# Patient Record
Sex: FEMALE | Race: WHITE | Employment: UNEMPLOYED | URBAN - METROPOLITAN AREA
[De-identification: names, ages, dates, MRNs, and addresses within clinical notes are randomized per-mention and may not be internally consistent; named-entity substitution may affect disease eponyms.]

---

## 2018-01-18 ENCOUNTER — HOSPITAL ENCOUNTER (OUTPATIENT)
Dept: MAMMOGRAPHY | Age: 53
Discharge: HOME | End: 2018-01-18
Attending: OBSTETRICS & GYNECOLOGY | Admitting: OBSTETRICS & GYNECOLOGY

## 2018-01-18 DIAGNOSIS — Z12.31 VISIT FOR SCREENING MAMMOGRAM: ICD-10-CM

## 2018-01-20 ENCOUNTER — HOSPITAL ENCOUNTER (OUTPATIENT)
Dept: MAMMOGRAPHY | Age: 53
Discharge: HOME | End: 2018-01-20
Attending: OBSTETRICS & GYNECOLOGY | Admitting: OBSTETRICS & GYNECOLOGY

## 2018-01-20 DIAGNOSIS — Z12.31 VISIT FOR SCREENING MAMMOGRAM: ICD-10-CM

## 2018-01-23 ENCOUNTER — HOSPITAL ENCOUNTER (OUTPATIENT)
Dept: MAMMOGRAPHY | Age: 53
Discharge: HOME | End: 2018-01-23
Attending: OBSTETRICS & GYNECOLOGY | Admitting: OBSTETRICS & GYNECOLOGY

## 2018-01-23 DIAGNOSIS — R92.8 ABNORMAL FINDING ON BREAST IMAGING: ICD-10-CM

## 2019-01-28 ENCOUNTER — HOSPITAL ENCOUNTER (OUTPATIENT)
Dept: MAMMOGRAPHY | Age: 54
Discharge: HOME | End: 2019-01-28
Payer: COMMERCIAL

## 2019-01-28 DIAGNOSIS — Z12.31 VISIT FOR SCREENING MAMMOGRAM: ICD-10-CM

## 2019-01-28 PROCEDURE — 77063 BREAST TOMOSYNTHESIS BI: CPT

## 2019-01-30 ENCOUNTER — HOSPITAL ENCOUNTER (OUTPATIENT)
Dept: ULTRASOUND IMAGING | Age: 54
Discharge: HOME | End: 2019-01-30
Payer: COMMERCIAL

## 2019-01-30 ENCOUNTER — HOSPITAL ENCOUNTER (OUTPATIENT)
Dept: MAMMOGRAPHY | Age: 54
Discharge: HOME | End: 2019-01-30
Payer: COMMERCIAL

## 2019-01-30 DIAGNOSIS — R92.8 ABNORMAL MAMMOGRAM: ICD-10-CM

## 2019-01-30 PROCEDURE — 76642 ULTRASOUND BREAST LIMITED: CPT

## 2019-01-30 PROCEDURE — 77065 DX MAMMO INCL CAD UNI: CPT

## 2019-06-25 ENCOUNTER — HOSPITAL ENCOUNTER (OUTPATIENT)
Dept: MAMMOGRAPHY | Age: 54
Discharge: HOME | End: 2019-06-25
Payer: COMMERCIAL

## 2019-06-25 DIAGNOSIS — Z12.31 VISIT FOR SCREENING MAMMOGRAM: ICD-10-CM

## 2019-06-25 PROCEDURE — G0279 TOMOSYNTHESIS, MAMMO: HCPCS

## 2021-08-05 ENCOUNTER — HOSPITAL ENCOUNTER (OUTPATIENT)
Dept: ULTRASOUND IMAGING | Age: 56
Discharge: HOME | End: 2021-08-05
Payer: COMMERCIAL

## 2021-08-05 ENCOUNTER — HOSPITAL ENCOUNTER (OUTPATIENT)
Dept: MAMMOGRAPHY | Age: 56
Discharge: HOME | End: 2021-08-05
Payer: COMMERCIAL

## 2021-08-05 VITALS — HEIGHT: 63 IN | WEIGHT: 160 LBS | BODY MASS INDEX: 28.35 KG/M2

## 2021-08-05 DIAGNOSIS — Z12.31 VISIT FOR SCREENING MAMMOGRAM: ICD-10-CM

## 2021-08-05 DIAGNOSIS — R92.8 ABNORMAL MAMMOGRAM: ICD-10-CM

## 2021-08-05 PROCEDURE — 77063 BREAST TOMOSYNTHESIS BI: CPT

## 2021-08-05 PROCEDURE — 76642 ULTRASOUND BREAST LIMITED: CPT

## 2021-08-10 ENCOUNTER — HOSPITAL ENCOUNTER (OUTPATIENT)
Dept: ULTRASOUND IMAGING | Age: 56
Discharge: HOME | End: 2021-08-10
Payer: COMMERCIAL

## 2021-08-10 ENCOUNTER — HOSPITAL ENCOUNTER (OUTPATIENT)
Dept: MAMMOGRAPHY | Age: 56
Discharge: HOME | End: 2021-08-10
Payer: COMMERCIAL

## 2021-08-10 DIAGNOSIS — N63.0 BREAST MASS: ICD-10-CM

## 2021-08-10 DIAGNOSIS — R92.8 ABNORMAL MAMMOGRAM: ICD-10-CM

## 2021-08-10 PROCEDURE — 77065 DX MAMMO INCL CAD UNI: CPT

## 2021-08-10 PROCEDURE — 88342 IMHCHEM/IMCYTCHM 1ST ANTB: CPT

## 2021-08-10 PROCEDURE — 88305 TISSUE EXAM BY PATHOLOGIST: CPT

## 2021-08-10 PROCEDURE — 88360 TUMOR IMMUNOHISTOCHEM/MANUAL: CPT

## 2021-08-10 PROCEDURE — 2720000010 US BREAST BIOPSY W LOC DEVICE 1ST LESION RIGHT

## 2021-08-10 PROCEDURE — 88341 IMHCHEM/IMCYTCHM EA ADD ANTB: CPT

## 2021-08-12 ENCOUNTER — TELEPHONE (OUTPATIENT)
Dept: SURGERY | Age: 56
End: 2021-08-12

## 2021-08-12 NOTE — TELEPHONE ENCOUNTER
Breast History:  History of Previous Breast Biopsy:no  Self Breast Exams Completed:yes  Family History of Breast Cancer:no    Family History of Other Cancers:yes-Brother Lymphoma 55  55   Ashkenazi Religion Decent:no  Bra Size: 38EEE    Gyne History:  : 1,   Para: 1  Age of Menarche: 14 years  Age of Menopause: 47 years   Age of first live Birth: 31 years  History of Hysterectomy / SYD-BSO: No  History of OCP's/HRT:Estrodial   When and how lon years - Stopped 20  Family History or personal history of Ovarian Cancer: no       6.6% Lifetime Risk

## 2021-08-13 ENCOUNTER — OFFICE VISIT (OUTPATIENT)
Dept: SURGERY | Age: 56
End: 2021-08-13
Payer: COMMERCIAL

## 2021-08-13 ENCOUNTER — TELEPHONE (OUTPATIENT)
Dept: SURGERY | Age: 56
End: 2021-08-13

## 2021-08-13 VITALS — BODY MASS INDEX: 30.25 KG/M2 | WEIGHT: 164.4 LBS | HEIGHT: 62 IN

## 2021-08-13 DIAGNOSIS — Z17.0 MALIGNANT NEOPLASM OF UPPER-OUTER QUADRANT OF RIGHT BREAST IN FEMALE, ESTROGEN RECEPTOR POSITIVE (HCC): Primary | ICD-10-CM

## 2021-08-13 DIAGNOSIS — C50.411 MALIGNANT NEOPLASM OF UPPER-OUTER QUADRANT OF RIGHT BREAST IN FEMALE, ESTROGEN RECEPTOR POSITIVE (HCC): Primary | ICD-10-CM

## 2021-08-13 PROCEDURE — 99244 OFF/OP CNSLTJ NEW/EST MOD 40: CPT | Performed by: SURGERY

## 2021-08-13 RX ORDER — ESCITALOPRAM OXALATE 10 MG/1
TABLET ORAL
COMMUNITY
Start: 2021-06-26

## 2021-08-13 RX ORDER — BUPROPION HYDROCHLORIDE 150 MG/1
TABLET ORAL
COMMUNITY
Start: 2021-06-22

## 2021-08-13 RX ORDER — CALCIUM POLYCARBOPHIL 625 MG 625 MG/1
TABLET ORAL 2 TIMES DAILY
COMMUNITY

## 2021-08-13 NOTE — TELEPHONE ENCOUNTER
Patient is returning call from [place] about changing her surgery date. Please call the patient back at [phone].

## 2021-08-13 NOTE — LETTER
CONSENT TO OPERATION  AND/OR OTHER PROCEDURE(S)          PATIENT : Lizeth Maxwell   YOB: 1965      DATE : 8/13/21          1. I request and consent that Dr. Carl Woodruff,  and/or his associates or assistants perform an operation and/or procedures on the above patient at  [facility] to treat the condition(s) which appear indicated by the diagnostic studies already performed. I have been told that in general terms the nature, purpose and reasonable expectations of the operation and/or procedure(s) are:       Radiofrequency Identification Localized Right Breast Lumpectomy, Right Guild Node Biopsy with Radioisotope and Gamma Probe        2. It has been explained to me by the informing physician that during the course of the operation and/or procedure(s) unforeseen conditions may be revealed that necessitate an extension of the original operation and/or procedure(s) or different operation and/or procedures than those set forth in Paragraph 1. I therefore authorize and request that my physician and/or his associates or assistants perform such operations and/or procedures as are necessary and desirable in the exercise of professional judgment. The authority granted under this Paragraph 2 shall extend to all conditions that require treatment and are known to my physician at the time the operation is commenced. 3. I have been made aware by the informing physician of certain risks and consequences that are associated with the operation and/or procedure(s) described in Paragraph 1, the reasonable alternative methods or treatment, the possible consequences, the possibility that the operation and/or procedure(s) may be unsuccessful and the possibility of complications.   I understand the reasonably known risks to be:      - Bleeding  - Infection/COVID 19 / Needs a Pre Op Exam  - Poor Healing  - Possible Damage to Nerve, Vessel, Tendon/Muscle or Bone  - Possible need for additional Treatment/Surgery/Re Excision  - Stiffness  - Persistent Pain   - Residual or Recurrent Symptoms  - Anesthetic and/or Medical Risks  - Possible failed operation  - Lymphedema/swelling  - Nipple loss  - Numbness or nerve injury  - Unappealing cosmetics              4. I have also been informed by the informing physician that there are other risks from both known and unknown causes that are attendant to the performance of any surgical procedure. I am aware that the practice of medicine and surgery is not an exact science, and that no guarantees have been made to me concerning the results of the operation and/or procedure(s). 5. I   CONSENT / REFUSE CONSENT  (strike the phrase that does not apply) to the taking of photographs before, during and/or after the operation or procedure for scientific/educational purposes. 6. I consent to the administration of anesthesia and to the use of such anesthetics as may be deemed advisable by the anesthesiologist who has been engaged by me or my physician. 7. I certify that I have read and understand the above consent to operation and/or other procedure(s); that the explanations therein referred to were made to me by the informing physician in advance of my signing this consent; that all blanks or statements requiring insertion or completion were filled in and inapplicable paragraphs, if any, were stricken before I signed; and that all questions asked by me about the operation and/or procedure(s) which I have consented to have been fully answered in a satisfactory manner.               _______________________  Witness        Signature Of Patient          _______________________  Informing Physician         Signature of Informing Physician           If patient is unable to sign or is a minor, complete one of the following:    (A)  Patient is a minor   years of age.     (B)  Patient is unable to sign because:

## 2021-08-13 NOTE — PATIENT INSTRUCTIONS
Pathology results were discussed with patient today in office  Breast exam   Discussed surgery options / risks / recovery / aftercare  Discussed  radiation and chemotherapy treatments  Discussed medications       Surgery Consent signed  Needs a Pre Op Exam - Surgery Date 08.27.21 ( Pre Op Form Given )  Patient is vaccinated      Healthy Lifestyle Recommendations: healthy diet (decrease consumption of red meat, increase fresh fruits and vegetables), decreased alcohol consumption (less than 4 drinks/week), adequate sleep (goal 6-8 hours), routine exercise (goal 150 minutes/week or greater), weight control.

## 2021-08-13 NOTE — PROGRESS NOTES
Subjective:      Patient ID: Delvin Contreras is a 63 y.o. female. HPI   Chief Complaint   Patient presents with    Consultation     NP Consult - Referred By Dr. Robert Cohen [facility]     Patient had a screening mammogram earlier this month showing scattered fibroglandular tissue and a new small nodule right breast. Ultrasound showed a 7 mm mass 11:00, 2 cmfn. No axillary adenopathy. Core biopsy of the mass showed invasive ductal carcinoma, ER/MN positive, Her2 equivocal. She has not felt any breast masses, no breast pain or nipple discharge. Breast History:  History of Previous Breast Biopsy:no  Self Breast Exams Completed:yes  Family History of Breast Cancer:no    Family History of Other Cancers:yes-Brother Lymphoma 55  55   Ashkenazi Zoroastrian Decent:no  Bra Size: 43EEE     Gyne History:  : 1,   Para: 1  Age of Menarche: 14 years  Age of Menopause: 47 years   Age of first live Birth: 31 years  History of Hysterectomy / SYD-BSO: No  History of OCP's/HRT:Estrodial   When and how lon years - Stopped 20  Family History or personal history of Ovarian Cancer: no         6.6% Lifetime Risk                      No past medical history on file. Past Surgical History:   Procedure Laterality Date    US BREAST NEEDLE BIOPSY RIGHT Right 8/10/2021    US BREAST NEEDLE BIOPSY RIGHT 8/10/2021 [facility] ULTRASOUND       No current outpatient medications on file. No current facility-administered medications for this visit.        Social History     Socioeconomic History    Marital status:      Spouse name: Not on file    Number of children: Not on file    Years of education: Not on file    Highest education level: Not on file   Occupational History    Not on file   Tobacco Use    Smoking status: Not on file   Substance and Sexual Activity    Alcohol use: Not on file    Drug use: Not on file    Sexual activity: Not on file   Other Topics Concern    Not on file   Social History Narrative    Not on file     Social Determinants of Health     Financial Resource Strain:     Difficulty of Paying Living Expenses:    Food Insecurity:     Worried About Running Out of Food in the Last Year:     [address] in the Last Year:    Transportation Needs:     Lack of Transportation (Medical):  Lack of Transportation (Non-Medical):    Physical Activity:     Days of Exercise per Week:     Minutes of Exercise per Session:    Stress:     Feeling of Stress :    Social Connections:     Frequency of Communication with Friends and Family:     Frequency of Social Gatherings with Friends and Family:     Attends Evangelical Services:     Active Member of Clubs or Organizations:     Attends Club or Organization Meetings:     Marital Status:    Intimate Partner Violence:     Fear of Current or Ex-Partner:     Emotionally Abused:     Physically Abused:     Sexually Abused:        Objective:   Physical Exam    Bilateral breasts - Normal contour, no masses, no nipple changes. Small amount of ecchymosis right breast post biopsy. No cervical or axillary adenopathy. Assessment:      Diagnosis Orders   1. Malignant neoplasm of upper-outer quadrant of right breast in female, estrogen receptor positive ([facility] [address]            Plan:     Discussed breast biopsy results with patient. Reviewed surgical options, including lumpectomy, mastectomy, and mastectomy with reconstruction. Also reviewed the sentinal node procedure. Recommendations were made following standard NCCN guidelines. She wishes to proceed with RFID localized right breast lumpectomy and sentinel node biopsy for clinical T1N0 ductal carcinoma.  The indications for the planned procedure, along with the potential benefits and risks which include but are not limited to the risk of anesthesia, bleeding, infection, possible failed operation, possible need for additional surgery pending final pathologic assessment, nipple loss, lymphedema, sensation changes, nerve injury, persistent pain, and unappealing cosmetics were reviewed. The discussion I have done encompasses risks, benefits, and side effects related to the alternatives and the risks related to not receiving the proposed care, treatment, and services. All questions were answered and she agrees to proceed. The patient was counseled at length about the risks of faiza Covid-19 in the tao-operative and post-operative states including the recovery window of their procedure. The patient was made aware that faiza Covid-19 after a surgical procedure may worsen their prognosis for recovering from the virus and lend to a higher morbidity and or mortality risk. The patient was given the options of postponing their procedure. All of the risks, benefits, and alternatives were discussed. The patient does wish to proceed with the procedure. On this date 08/13/21 I have spent 60 minutes reviewing previous notes, test results, and face to face with the patient discussing the diagnosis and importance of compliance with the treatment plan as well as documenting on the day of the visit.      Electronically signed by Jamison Babin MD on 8/13/2021 at 11:29 AM

## 2021-08-17 ENCOUNTER — HOSPITAL ENCOUNTER (OUTPATIENT)
Dept: MAMMOGRAPHY | Age: 56
Discharge: HOME | End: 2021-08-17
Payer: COMMERCIAL

## 2021-08-17 ENCOUNTER — HOSPITAL ENCOUNTER (OUTPATIENT)
Dept: ULTRASOUND IMAGING | Age: 56
Discharge: HOME | End: 2021-08-17
Payer: COMMERCIAL

## 2021-08-17 DIAGNOSIS — Z12.31 VISIT FOR SCREENING MAMMOGRAM: ICD-10-CM

## 2021-08-17 DIAGNOSIS — Z17.0 MALIGNANT NEOPLASM OF UPPER-OUTER QUADRANT OF RIGHT BREAST IN FEMALE, ESTROGEN RECEPTOR POSITIVE (HCC): ICD-10-CM

## 2021-08-17 DIAGNOSIS — C50.411 MALIGNANT NEOPLASM OF UPPER-OUTER QUADRANT OF RIGHT BREAST IN FEMALE, ESTROGEN RECEPTOR POSITIVE (HCC): ICD-10-CM

## 2021-08-17 DIAGNOSIS — R92.8 ABNORMAL MAMMOGRAM: ICD-10-CM

## 2021-08-17 PROCEDURE — 2709999900 US PLACE BREAST LOC DEVICE 1ST LESION RIGHT

## 2021-08-17 PROCEDURE — 77065 DX MAMMO INCL CAD UNI: CPT

## 2021-08-20 ENCOUNTER — TELEPHONE (OUTPATIENT)
Dept: SURGERY | Age: 56
End: 2021-08-20

## 2021-08-20 NOTE — TELEPHONE ENCOUNTER
Spoke with patient regarding scheduled surgery on 08/25/21 with Dr. Lisa Francisco. Patient is asked to arrive by 7:15 am @ The [address] [place] [address] [address] after midnight. May take any Heart or Blood Pressure medication with a small sip of water to wash them down. You will need someone to drive you home following this procedure. Please bring a Photo ID & Insurance card with you, and check-in at the Surgery Desk Patient will see  PCP for Pre-op H & P on 8.21.21. Surgery is scheduled to start at approx. 9:15 am and should take approx. 90 minutes. If the hospital needs any further information, someone will give you a call. Patient expressed a verbal understanding of these instructions and had no further questions at this time. Mailed instruction letter. Call ended.

## 2021-08-23 NOTE — PROGRESS NOTES
[address] [facility] patients having surgery or anesthesia are required to be Covid tested OR to have been vaccinated at least 14 days prior to your procedure. It is very important to return our call to [phone] and notify the staff of your last vaccination date otherwise you will be required to complete Covid PCR test within the 5-6 days prior to surgery & quarantine. The results will need to be faxed to PreAdmission Testing at [phone]. PRIOR TO PROCEDURE DATE:        1. PLEASE FOLLOW ANY  GUIDELINES/ INSTRUCTIONS PRIOR TO YOUR PROCEDURE AS ADVISED BY YOUR SURGEON. 2. Arrange for someone to drive you home and be with you for the first 24 hours after discharge for your safety after your procedure for which you received sedation. Ensure it is someone we can share information with regarding your discharge. 3. You must contact your surgeon for instructions IF:   You are taking any blood thinners, aspirin, anti-inflammatory or vitamin E.   There is a change in your physical condition such as a cold, fever, rash, cuts, sores or any other infection, especially near your surgical site. 4. Do not drink alcohol the day before or day of your procedure. 5. A Pre-op History and Physical for surgery MUST be completed by your Physician or Urgent Care within 30 days of your procedure date. Please bring a copy with you on the day of your procedure and along with any other testing performed. THE DAY OF YOUR PROCEDURE:  1. Follow instructions for ARRIVAL TIME as DIRECTED BY YOUR SURGEON. 2. Enter the MAIN entrance from [address] [place] [address] and follow the signs to the free [employer] or [employer] parking (offered free of charge 6am-5pm). 3. Enter the Main Entrance of the hospital (do not enter from the lower level of the parking garage). Upon entrance, check in with the  at the main desk on your left. If no one is available at the desk, proceed into the [facility] Waiting Room and go through the door directly into the [facility]. There is a Check-in desk ACROSS from Room 5 (marked with a sign hanging from the ceiling). The phone number for the surgery center is [phone]. 4. Please call [phone] option #2 option #2 if you have not been preregistered yet. On the day of your procedure bring your insurance card and photo ID. You will be registered at your bedside once brought back to your room. 5. DO NOT EAT ANYTHING eight hours prior to your arrival for surgery. May have 8 ounces of water 4 hours prior to your arrival for surgery. NOTE: ALL Gastric, Bariatric and Bowel surgery patients MUST follow their surgeon's instructions. 6. MEDICATIONS    Take the following medications with a SMALL sip of water:    Bariatric patient's call surgeon if on diabetic medications as some need to be stopped 1 week preop   Use your usual dose of inhalers the morning of surgery. BRING your rescue inhaler with you to hospital.    Anesthesia does NOT want you to take insulin the morning of surgery. They will control your blood sugar while you are at the hospital. Please contact your ordering physician for instructions regarding your insulin the night before your procedure. If you have an insulin pump, please keep it set on basal rate. 7. Do not swallow water when brushing teeth. No gum, candy, mints or ice chips. Refrain from smoking or at least decrease the amount. 8. Dress in loose, comfortable clothing appropriate for redressing after your procedure. Do not wear jewelry (including body piercings), make-up (especially NO eye make-up), fingernail polish (NO toenail polish if foot/leg surgery), lotion, powders or metal hairclips. 9. Dentures, glasses, or contacts will need to be removed before your procedure.  Bring cases for your glasses, contacts, dentures, or hearing aids to protect them while you are in surgery. 10. If you use a CPAP, please bring it with you on the day of your procedure. 11. We recommend that valuable personal  belongings such as cash, cell phones, e-tablets or jewelry, be left at home during your stay. The hospital will not be responsible for valuables that are not secured in the hospital safe. However, if your insurance requires a co-pay, you may want to bring a method of payment, i.e. Check or credit card, if you wish to pay your co-pay the day of surgery. 12. If you are to stay overnight, you may bring a bag with personal items. Please have any large items you may need brought in by your family after your arrival to your hospital room. 15. If you have a Living Will or Durable Power of , please bring a copy on the day of your procedure. 15. With your permission, one family member may accompany you while you are being prepared for surgery. Once you are ready, additional family members may join you. HOW WE KEEP YOU SAFE and WORK TO PREVENT SURGICAL SITE INFECTIONS:  1. Health care workers should always check your ID bracelet to verify your name and birth date. You will be asked many times to state your name, date of birth, and allergies. 2. Health care workers should always clean their hands with soap or alcohol gel before providing care to you. It is okay to ask anyone if they cleaned their hands before they touch you. 3. You will be actively involved in verifying the type of procedure you are having and ensuring the correct surgical site. This will be confirmed multiple times prior to your procedure. Do NOT saad your surgery site UNLESS instructed to by your surgeon. 4. Do not shave or wax for 72 hours prior to procedure near your operative site. Shaving with a razor can irritate your skin and make it easier to develop an infection.  On the day of your procedure, any hair that needs to be removed near the surgical site will be clipped by a healthcare worker using a special clippers designed to avoid skin irritation. 5. When you are in the operating room, your surgical site will be cleansed with a special soap, and in most cases, you will be given an antibiotic before the surgery begins. What to expect AFTER YOUR PROCEDURE:  1. Immediately following your procedure, your will be taken to the PACU for the first phase of your recovery. Your nurse will help you recover from any potential side effects of anesthesia, such as extreme drowsiness, changes in your vital signs or breathing patterns. Nausea, headache, muscle aches, or sore throat may also occur after anesthesia. Your nurse will help you manage these potential side effects. 2. For comfort and safety, arrange to have someone at home with you for the first 24 hours after discharge. 3. You and your family will be given written instructions about your diet, activity, dressing care, medications, and return visits. 4. Once at home, should issues with nausea, pain, or bleeding occur, or should you notice any signs of infection, you should call your surgeon. 5. Always clean your hands before and after caring for your wound. Do not let your family touch your surgery site without cleaning their hands. 6. Narcotic pain medications can cause significant constipation. You may want to add a stool softener to your postoperative medication schedule or speak to your surgeon on how best to manage this SIDE EFFECT. SPECIAL INSTRUCTIONS     Thank you for allowing us to care for you. We strive to exceed your expectations in the delivery of care and service provided to you and your family. If you need to contact the [place] [address] staff for any reason, please call us at [phone]    Instructions reviewed with patient during preadmission testing phone interview.   Julia Nava RN.8/23/2021 .10:47 AM      ADDITIONAL EDUCATIONAL INFORMATION REVIEWED PER PHONE WITH YOU AND/OR YOUR FAMILY:  No Hibiclens® Bathing Instructions   Yes Antibacterial Soap

## 2021-08-24 ENCOUNTER — ANESTHESIA EVENT (OUTPATIENT)
Dept: OPERATING ROOM | Age: 56
End: 2021-08-24
Payer: COMMERCIAL

## 2021-08-25 ENCOUNTER — HOSPITAL ENCOUNTER (OUTPATIENT)
Dept: NUCLEAR MEDICINE | Age: 56
Discharge: HOME | End: 2021-08-25
Payer: COMMERCIAL

## 2021-08-25 ENCOUNTER — HOSPITAL ENCOUNTER (OUTPATIENT)
Dept: MAMMOGRAPHY | Age: 56
Discharge: HOME | End: 2021-08-25
Payer: COMMERCIAL

## 2021-08-25 ENCOUNTER — ANESTHESIA (OUTPATIENT)
Dept: OPERATING ROOM | Age: 56
End: 2021-08-25
Payer: COMMERCIAL

## 2021-08-25 ENCOUNTER — HOSPITAL ENCOUNTER (OUTPATIENT)
Age: 56
Setting detail: OUTPATIENT SURGERY
Discharge: HOME | End: 2021-08-25
Attending: SURGERY | Admitting: SURGERY
Payer: COMMERCIAL

## 2021-08-25 VITALS
DIASTOLIC BLOOD PRESSURE: 75 MMHG | TEMPERATURE: 98.5 F | SYSTOLIC BLOOD PRESSURE: 126 MMHG | OXYGEN SATURATION: 100 % | WEIGHT: 163 LBS | HEART RATE: 70 BPM | HEIGHT: 62 IN | BODY MASS INDEX: 30 KG/M2 | RESPIRATION RATE: 16 BRPM

## 2021-08-25 VITALS — DIASTOLIC BLOOD PRESSURE: 91 MMHG | TEMPERATURE: 97.2 F | OXYGEN SATURATION: 100 % | SYSTOLIC BLOOD PRESSURE: 159 MMHG

## 2021-08-25 DIAGNOSIS — R93.89 ABNORMAL FINDING ON IMAGING: ICD-10-CM

## 2021-08-25 DIAGNOSIS — Z17.0 MALIGNANT NEOPLASM OF UPPER-OUTER QUADRANT OF RIGHT BREAST IN FEMALE, ESTROGEN RECEPTOR POSITIVE (HCC): Primary | ICD-10-CM

## 2021-08-25 DIAGNOSIS — Z17.0 MALIGNANT NEOPLASM OF UPPER-OUTER QUADRANT OF RIGHT BREAST IN FEMALE, ESTROGEN RECEPTOR POSITIVE (HCC): ICD-10-CM

## 2021-08-25 DIAGNOSIS — C50.411 MALIGNANT NEOPLASM OF UPPER-OUTER QUADRANT OF RIGHT FEMALE BREAST, UNSPECIFIED ESTROGEN RECEPTOR STATUS (HCC): ICD-10-CM

## 2021-08-25 DIAGNOSIS — C50.411 MALIGNANT NEOPLASM OF UPPER-OUTER QUADRANT OF RIGHT BREAST IN FEMALE, ESTROGEN RECEPTOR POSITIVE (HCC): ICD-10-CM

## 2021-08-25 DIAGNOSIS — C50.411 MALIGNANT NEOPLASM OF UPPER-OUTER QUADRANT OF RIGHT BREAST IN FEMALE, ESTROGEN RECEPTOR POSITIVE (HCC): Primary | ICD-10-CM

## 2021-08-25 LAB — PREGNANCY, URINE: NEGATIVE

## 2021-08-25 PROCEDURE — 38525 BIOPSY/REMOVAL LYMPH NODES: CPT | Performed by: SURGERY

## 2021-08-25 PROCEDURE — 7100000011 HC PHASE II RECOVERY - ADDTL 15 MIN: Performed by: SURGERY

## 2021-08-25 PROCEDURE — 2720000010 HC SURG SUPPLY STERILE: Performed by: SURGERY

## 2021-08-25 PROCEDURE — 3430000000 HC RX DIAGNOSTIC RADIOPHARMACEUTICAL: Performed by: SURGERY

## 2021-08-25 PROCEDURE — 2709999900 HC NON-CHARGEABLE SUPPLY: Performed by: SURGERY

## 2021-08-25 PROCEDURE — 2580000003 HC RX 258: Performed by: ANESTHESIOLOGY

## 2021-08-25 PROCEDURE — 19301 PARTIAL MASTECTOMY: CPT | Performed by: SURGERY

## 2021-08-25 PROCEDURE — 88307 TISSUE EXAM BY PATHOLOGIST: CPT

## 2021-08-25 PROCEDURE — 14001 TIS TRNFR TRUNK 10.1-30SQCM: CPT | Performed by: SURGERY

## 2021-08-25 PROCEDURE — 6360000002 HC RX W HCPCS: Performed by: NURSE ANESTHETIST, CERTIFIED REGISTERED

## 2021-08-25 PROCEDURE — 3600000014 HC SURGERY LEVEL 4 ADDTL 15MIN: Performed by: SURGERY

## 2021-08-25 PROCEDURE — 2500000003 HC RX 250 WO HCPCS: Performed by: SURGERY

## 2021-08-25 PROCEDURE — 88305 TISSUE EXAM BY PATHOLOGIST: CPT

## 2021-08-25 PROCEDURE — 3600000004 HC SURGERY LEVEL 4 BASE: Performed by: SURGERY

## 2021-08-25 PROCEDURE — 19294 PREPJ TUM CAV IORT PRTL MAST: CPT | Performed by: SURGERY

## 2021-08-25 PROCEDURE — 84703 CHORIONIC GONADOTROPIN ASSAY: CPT

## 2021-08-25 PROCEDURE — 7100000010 HC PHASE II RECOVERY - FIRST 15 MIN: Performed by: SURGERY

## 2021-08-25 PROCEDURE — 3700000000 HC ANESTHESIA ATTENDED CARE: Performed by: SURGERY

## 2021-08-25 PROCEDURE — 76098 X-RAY EXAM SURGICAL SPECIMEN: CPT

## 2021-08-25 PROCEDURE — 2500000003 HC RX 250 WO HCPCS: Performed by: NURSE ANESTHETIST, CERTIFIED REGISTERED

## 2021-08-25 PROCEDURE — A4648 IMPLANTABLE TISSUE MARKER: HCPCS | Performed by: SURGERY

## 2021-08-25 PROCEDURE — 78195 LYMPH SYSTEM IMAGING: CPT

## 2021-08-25 PROCEDURE — 2580000003 HC RX 258: Performed by: SURGERY

## 2021-08-25 PROCEDURE — 7100000000 HC PACU RECOVERY - FIRST 15 MIN: Performed by: SURGERY

## 2021-08-25 PROCEDURE — 88342 IMHCHEM/IMCYTCHM 1ST ANTB: CPT

## 2021-08-25 PROCEDURE — 7100000001 HC PACU RECOVERY - ADDTL 15 MIN: Performed by: SURGERY

## 2021-08-25 PROCEDURE — A9541 TC99M SULFUR COLLOID: HCPCS | Performed by: SURGERY

## 2021-08-25 PROCEDURE — 3700000001 HC ADD 15 MINUTES (ANESTHESIA): Performed by: SURGERY

## 2021-08-25 DEVICE — MARKER SURG TISS W2XH1XL2CM BIOZORB LO PROF: Type: IMPLANTABLE DEVICE | Site: BREAST | Status: FUNCTIONAL

## 2021-08-25 RX ORDER — BUPIVACAINE HYDROCHLORIDE 5 MG/ML
INJECTION, SOLUTION EPIDURAL; INTRACAUDAL PRN
Status: DISCONTINUED | OUTPATIENT
Start: 2021-08-25 | End: 2021-08-25 | Stop reason: ALTCHOICE

## 2021-08-25 RX ORDER — OXYCODONE HYDROCHLORIDE AND ACETAMINOPHEN 5; 325 MG/1; MG/1
1 TABLET ORAL PRN
Status: DISCONTINUED | OUTPATIENT
Start: 2021-08-25 | End: 2021-08-25 | Stop reason: HOSPADM

## 2021-08-25 RX ORDER — DIPHENHYDRAMINE HYDROCHLORIDE 50 MG/ML
12.5 INJECTION INTRAMUSCULAR; INTRAVENOUS
Status: DISCONTINUED | OUTPATIENT
Start: 2021-08-25 | End: 2021-08-25 | Stop reason: HOSPADM

## 2021-08-25 RX ORDER — HYDROCODONE BITARTRATE AND ACETAMINOPHEN 5; 325 MG/1; MG/1
1 TABLET ORAL EVERY 6 HOURS PRN
Qty: 5 TABLET | Refills: 0 | Status: SHIPPED | OUTPATIENT
Start: 2021-08-25 | End: 2021-08-28

## 2021-08-25 RX ORDER — DEXAMETHASONE SODIUM PHOSPHATE 4 MG/ML
INJECTION, SOLUTION INTRA-ARTICULAR; INTRALESIONAL; INTRAMUSCULAR; INTRAVENOUS; SOFT TISSUE PRN
Status: DISCONTINUED | OUTPATIENT
Start: 2021-08-25 | End: 2021-08-25 | Stop reason: SDUPTHER

## 2021-08-25 RX ORDER — OXYCODONE HYDROCHLORIDE AND ACETAMINOPHEN 5; 325 MG/1; MG/1
2 TABLET ORAL PRN
Status: DISCONTINUED | OUTPATIENT
Start: 2021-08-25 | End: 2021-08-25 | Stop reason: HOSPADM

## 2021-08-25 RX ORDER — FENTANYL CITRATE 50 UG/ML
25 INJECTION, SOLUTION INTRAMUSCULAR; INTRAVENOUS EVERY 5 MIN PRN
Status: DISCONTINUED | OUTPATIENT
Start: 2021-08-25 | End: 2021-08-25 | Stop reason: HOSPADM

## 2021-08-25 RX ORDER — MAGNESIUM HYDROXIDE 1200 MG/15ML
LIQUID ORAL CONTINUOUS PRN
Status: COMPLETED | OUTPATIENT
Start: 2021-08-25 | End: 2021-08-25

## 2021-08-25 RX ORDER — ACETAMINOPHEN 325 MG/1
650 TABLET ORAL
Status: DISCONTINUED | OUTPATIENT
Start: 2021-08-25 | End: 2021-08-25 | Stop reason: HOSPADM

## 2021-08-25 RX ORDER — EPHEDRINE SULFATE 50 MG/ML
INJECTION INTRAVENOUS PRN
Status: DISCONTINUED | OUTPATIENT
Start: 2021-08-25 | End: 2021-08-25 | Stop reason: SDUPTHER

## 2021-08-25 RX ORDER — PROPOFOL 10 MG/ML
INJECTION, EMULSION INTRAVENOUS PRN
Status: DISCONTINUED | OUTPATIENT
Start: 2021-08-25 | End: 2021-08-25 | Stop reason: SDUPTHER

## 2021-08-25 RX ORDER — FENTANYL CITRATE 50 UG/ML
INJECTION, SOLUTION INTRAMUSCULAR; INTRAVENOUS PRN
Status: DISCONTINUED | OUTPATIENT
Start: 2021-08-25 | End: 2021-08-25 | Stop reason: SDUPTHER

## 2021-08-25 RX ORDER — SODIUM CHLORIDE, SODIUM LACTATE, POTASSIUM CHLORIDE, CALCIUM CHLORIDE 600; 310; 30; 20 MG/100ML; MG/100ML; MG/100ML; MG/100ML
INJECTION, SOLUTION INTRAVENOUS CONTINUOUS
Status: DISCONTINUED | OUTPATIENT
Start: 2021-08-25 | End: 2021-08-25 | Stop reason: HOSPADM

## 2021-08-25 RX ORDER — NEOSTIGMINE METHYLSULFATE 5 MG/5 ML
SYRINGE (ML) INTRAVENOUS PRN
Status: DISCONTINUED | OUTPATIENT
Start: 2021-08-25 | End: 2021-08-25 | Stop reason: SDUPTHER

## 2021-08-25 RX ORDER — LABETALOL HYDROCHLORIDE 5 MG/ML
5 INJECTION, SOLUTION INTRAVENOUS EVERY 10 MIN PRN
Status: DISCONTINUED | OUTPATIENT
Start: 2021-08-25 | End: 2021-08-25 | Stop reason: HOSPADM

## 2021-08-25 RX ORDER — FENTANYL CITRATE 50 UG/ML
50 INJECTION, SOLUTION INTRAMUSCULAR; INTRAVENOUS EVERY 5 MIN PRN
Status: DISCONTINUED | OUTPATIENT
Start: 2021-08-25 | End: 2021-08-25 | Stop reason: HOSPADM

## 2021-08-25 RX ORDER — GLYCOPYRROLATE 1 MG/5 ML
SYRINGE (ML) INTRAVENOUS PRN
Status: DISCONTINUED | OUTPATIENT
Start: 2021-08-25 | End: 2021-08-25 | Stop reason: SDUPTHER

## 2021-08-25 RX ORDER — ONDANSETRON 2 MG/ML
INJECTION INTRAMUSCULAR; INTRAVENOUS PRN
Status: DISCONTINUED | OUTPATIENT
Start: 2021-08-25 | End: 2021-08-25 | Stop reason: SDUPTHER

## 2021-08-25 RX ORDER — ROCURONIUM BROMIDE 10 MG/ML
INJECTION, SOLUTION INTRAVENOUS PRN
Status: DISCONTINUED | OUTPATIENT
Start: 2021-08-25 | End: 2021-08-25 | Stop reason: SDUPTHER

## 2021-08-25 RX ORDER — MIDAZOLAM HYDROCHLORIDE 1 MG/ML
INJECTION INTRAMUSCULAR; INTRAVENOUS PRN
Status: DISCONTINUED | OUTPATIENT
Start: 2021-08-25 | End: 2021-08-25 | Stop reason: SDUPTHER

## 2021-08-25 RX ORDER — ONDANSETRON 2 MG/ML
4 INJECTION INTRAMUSCULAR; INTRAVENOUS
Status: DISCONTINUED | OUTPATIENT
Start: 2021-08-25 | End: 2021-08-25 | Stop reason: HOSPADM

## 2021-08-25 RX ORDER — HYDRALAZINE HYDROCHLORIDE 20 MG/ML
5 INJECTION INTRAMUSCULAR; INTRAVENOUS EVERY 10 MIN PRN
Status: DISCONTINUED | OUTPATIENT
Start: 2021-08-25 | End: 2021-08-25 | Stop reason: HOSPADM

## 2021-08-25 RX ORDER — PROCHLORPERAZINE EDISYLATE 5 MG/ML
5 INJECTION INTRAMUSCULAR; INTRAVENOUS
Status: DISCONTINUED | OUTPATIENT
Start: 2021-08-25 | End: 2021-08-25 | Stop reason: HOSPADM

## 2021-08-25 RX ADMIN — EPHEDRINE SULFATE 5 MG: 50 INJECTION INTRAVENOUS at 09:50

## 2021-08-25 RX ADMIN — EPHEDRINE SULFATE 5 MG: 50 INJECTION INTRAVENOUS at 09:56

## 2021-08-25 RX ADMIN — MIDAZOLAM HYDROCHLORIDE 2 MG: 2 INJECTION, SOLUTION INTRAMUSCULAR; INTRAVENOUS at 09:11

## 2021-08-25 RX ADMIN — SODIUM CHLORIDE, SODIUM LACTATE, POTASSIUM CHLORIDE, AND CALCIUM CHLORIDE: .6; .31; .03; .02 INJECTION, SOLUTION INTRAVENOUS at 08:15

## 2021-08-25 RX ADMIN — FENTANYL CITRATE 50 MCG: 50 INJECTION, SOLUTION INTRAMUSCULAR; INTRAVENOUS at 09:21

## 2021-08-25 RX ADMIN — Medication 4 MG: at 10:25

## 2021-08-25 RX ADMIN — Medication 0.8 MILLICURIE: at 09:34

## 2021-08-25 RX ADMIN — PROPOFOL 50 MG: 10 INJECTION, EMULSION INTRAVENOUS at 09:21

## 2021-08-25 RX ADMIN — SODIUM CHLORIDE, SODIUM LACTATE, POTASSIUM CHLORIDE, AND CALCIUM CHLORIDE: .6; .31; .03; .02 INJECTION, SOLUTION INTRAVENOUS at 09:14

## 2021-08-25 RX ADMIN — ONDANSETRON 4 MG: 2 INJECTION INTRAMUSCULAR; INTRAVENOUS at 10:31

## 2021-08-25 RX ADMIN — ROCURONIUM BROMIDE 50 MG: 10 INJECTION INTRAVENOUS at 09:20

## 2021-08-25 RX ADMIN — FENTANYL CITRATE 50 MCG: 50 INJECTION, SOLUTION INTRAMUSCULAR; INTRAVENOUS at 09:19

## 2021-08-25 RX ADMIN — PROPOFOL 150 MG: 10 INJECTION, EMULSION INTRAVENOUS at 09:19

## 2021-08-25 RX ADMIN — Medication 0.2 MG: at 09:45

## 2021-08-25 RX ADMIN — Medication 0.8 MG: at 10:25

## 2021-08-25 RX ADMIN — DEXAMETHASONE SODIUM PHOSPHATE 8 MG: 4 INJECTION, SOLUTION INTRAMUSCULAR; INTRAVENOUS at 09:43

## 2021-08-25 RX ADMIN — EPHEDRINE SULFATE 5 MG: 50 INJECTION INTRAVENOUS at 09:46

## 2021-08-25 ASSESSMENT — PULMONARY FUNCTION TESTS
PIF_VALUE: 18
PIF_VALUE: 18
PIF_VALUE: 17
PIF_VALUE: 18
PIF_VALUE: 17
PIF_VALUE: 17
PIF_VALUE: 18
PIF_VALUE: 18
PIF_VALUE: 17
PIF_VALUE: 18
PIF_VALUE: 7
PIF_VALUE: 17
PIF_VALUE: 18
PIF_VALUE: 18
PIF_VALUE: 17
PIF_VALUE: 18
PIF_VALUE: 18
PIF_VALUE: 19
PIF_VALUE: 21
PIF_VALUE: 17
PIF_VALUE: 17
PIF_VALUE: 19
PIF_VALUE: 18
PIF_VALUE: 17
PIF_VALUE: 1
PIF_VALUE: 20
PIF_VALUE: 18
PIF_VALUE: 17
PIF_VALUE: 16
PIF_VALUE: 0
PIF_VALUE: 17
PIF_VALUE: 2
PIF_VALUE: 3
PIF_VALUE: 18
PIF_VALUE: 1
PIF_VALUE: 18
PIF_VALUE: 18
PIF_VALUE: 17
PIF_VALUE: 18
PIF_VALUE: 18
PIF_VALUE: 1
PIF_VALUE: 17
PIF_VALUE: 18
PIF_VALUE: 18
PIF_VALUE: 16
PIF_VALUE: 17
PIF_VALUE: 18
PIF_VALUE: 17
PIF_VALUE: 18
PIF_VALUE: 17
PIF_VALUE: 8
PIF_VALUE: 1
PIF_VALUE: 1
PIF_VALUE: 19
PIF_VALUE: 18
PIF_VALUE: 0
PIF_VALUE: 18
PIF_VALUE: 19
PIF_VALUE: 18
PIF_VALUE: 18
PIF_VALUE: 17
PIF_VALUE: 18
PIF_VALUE: 2
PIF_VALUE: 18
PIF_VALUE: 17
PIF_VALUE: 18
PIF_VALUE: 18
PIF_VALUE: 0
PIF_VALUE: 18
PIF_VALUE: 17
PIF_VALUE: 17

## 2021-08-25 ASSESSMENT — PAIN - FUNCTIONAL ASSESSMENT: PAIN_FUNCTIONAL_ASSESSMENT: 0-10

## 2021-08-25 NOTE — H&P
[address]    [id number]    [facility] Same Day Surgery Update H & P  Department of General Surgery   Surgical Service   Pre-operative History and Physical  Last H & P within the last 30 days. DIAGNOSIS:   Malignant neoplasm of upper-outer quadrant of right female breast, unspecified estrogen receptor status ([facility] [address] [C50.411]    Procedure(s):  RADIOFREQUENCY INDENTIFICATION LOCALIZED RIGHT LUMPECTOMY, RIGHT AXILLARY SENTINEL NODE BIOPSY WITH RADIOSOTOPE WITH GAMMA PROBE  . History obtained from: Patient interview and EHR      HISTORY OF PRESENT ILLNESS:   Patient with biopsy demonstrated right breast invasive ductal carcinoma and the patient presents today for the above procedure. Covid 19:  Patient denies fever, chills, worsening cough, or known exposure to Covid-19.        Past Medical History:        Diagnosis Date    Diverticulitis      Past Surgical History:        Procedure Laterality Date    APPENDECTOMY      CHOLECYSTECTOMY      COLECTOMY  1988    COLONOSCOPY      LAPAROSCOPY      OVARY SURGERY Left     US BREAST NEEDLE BIOPSY RIGHT Right 8/10/2021    US BREAST NEEDLE BIOPSY RIGHT 8/10/2021 [facility]    US GUIDED NEEDLE LOC OF RIGHT BREAST Right 8/17/2021    US GUIDED NEEDLE LOC OF RIGHT BREAST 8/17/2021 Richard Spangler MD [facility]     Past Social History:  Social History     Socioeconomic History    Marital status:      Spouse name: None    Number of children: None    Years of education: None    Highest education level: None   Occupational History    None   Tobacco Use    Smoking status: Never Smoker    Smokeless tobacco: Never Used   Substance and Sexual Activity    Alcohol use: Never    Drug use: Never    Sexual activity: None   Other Topics Concern    None   Social History Narrative    None     Social Determinants of Health     Financial Resource Strain:     Difficulty of Paying Living Expenses:    Food Insecurity:     Worried About Running Out of Food in the Last Year:    [address] in the Last Year:    Transportation Needs:     Lack of Transportation (Medical):  Lack of Transportation (Non-Medical):    Physical Activity:     Days of Exercise per Week:     Minutes of Exercise per Session:    Stress:     Feeling of Stress :    Social Connections:     Frequency of Communication with Friends and Family:     Frequency of Social Gatherings with Friends and Family:     Attends Restoration Services:     Active Member of Clubs or Organizations:     Attends Club or Organization Meetings:     Marital Status:    Intimate Partner Violence:     Fear of Current or Ex-Partner:     Emotionally Abused:     Physically Abused:     Sexually Abused:          Medications Prior to Admission:      Prior to Admission medications    Medication Sig Start Date End Date Taking? Authorizing Provider   polycarbophil (FIBERCON) 625 MG tablet Take by mouth 2 times daily   Yes Historical Provider, MD   MULTIPLE VITAMIN PO Take by mouth daily   Yes Historical Provider, MD   buPROPion (WELLBUTRIN XL) 150 MG extended release tablet  6/22/21  Yes Historical Provider, MD   escitalopram (LEXAPRO) 10 MG tablet  6/26/21  Yes Historical Provider, MD         Allergies:  Patient has no known allergies. PHYSICAL EXAM:      BP (!) 147/90   Pulse 67   Temp 98.3 °F (36.8 °C) (Oral)   Resp 14   Ht 5' 2\" (1.575 m)   Wt 163 lb (73.9 kg)   SpO2 97%   BMI 29.81 kg/m²      Airway:  Airway patent with no audible stridor    Heart:  Regular rate and rhythm, No murmur noted    Lungs:  No increased work of breathing, good air exchange, clear to auscultation bilaterally, no crackles or wheezing    Abdomen:  Soft, non-distended, non-tender, no rebound tenderness or guarding, and no masses palpated    ASSESSMENT AND PLAN     Patient is a 63 y.o. female with above specified procedure planned.     1.  The patients history and physical was obtained and signed off by the pre-admission testing department. Patient seen and focused exam done today- no new changes since last physical exam on 8/21/21    2. Access to ancillary services are available per request of the provider.     NEHEMIAS Stewart - ANDI     8/25/2021

## 2021-08-25 NOTE — OP NOTE
Operative Note      Patient: Liliam Bowers  YOB: 1965  MRN: [id number]    Date of Procedure: 8/25/2021    Pre-Op Diagnosis: Malignant neoplasm of upper-outer quadrant of right female breast, unspecified estrogen receptor status ([facility] [address] [C50.411]    Post-Op Diagnosis: Same       Procedure(s):  RADIOFREQUENCY INDENTIFICATION LOCALIZED RIGHT LUMPECTOMY, RIGHT AXILLARY SENTINEL NODE BIOPSY WITH RADIOSOTOPE WITH GAMMA PROBE  . Surgeon(s): Dillan Poon MD    Assistant:   Surgical Assistant: Lord Mills    Anesthesia: General    Estimated Blood Loss (mL): Minimal    Complications: None    Specimens:   ID Type Source Tests Collected by Time Destination   A : RIGHT BREAST CANCER Tissue Tissue SURGICAL PATHOLOGY Dillan Poon MD 8/25/2021 5472    B : BREAST MARGIN-ANTERIOR/SUPERIOR Tissue Tissue SURGICAL PATHOLOGY Dillan Poon MD 8/25/2021 0957    C : RIGHT BREAST MARGIN-MEDIAL Tissue Tissue SURGICAL PATHOLOGY Dillan Poon MD 8/25/2021 5277    D : RIGHT BREAST MARGIN-INFERIOR/POSTERIOR Tissue Tissue SURGICAL PATHOLOGY Dillan Poon MD 8/25/2021 1000    E : RIGHT BREAST MARGIN-LATERAL Tissue Tissue SURGICAL PATHOLOGY Dillan Poon MD 8/25/2021 1001    F : RIGHT AXILLARY SENTINEL NODE Tissue Tissue SURGICAL PATHOLOGY Dillan Poon MD 8/25/2021 1006        Implants:  * No implants in log *      Drains: * No LDAs found *    Findings: see op note    Detailed Description of Procedure:              Operative Report      Name:  Liliam Bowers   MRN:  [id number]  Date:  8/25/2021        PREOPERATIVE DIAGNOSIS: right breast cancer. POSTOPERATIVE DIAGNOSIS: same    PROCEDURE:RFID localized right partial mastectomy, right axillary sentinel lymph node biopsy.     SURGEON: Lola    ESTIMATED BLOOD LOSS:  Less than 25 mL    SPECIMENS: right breast tissue, sentinel nodes    ASSISTANT: SURJIT العراقي    ANESTHESIA: General.    INDICATIONS FOR PROCEDURE: The patient is a 63 y.o. female who had  presented with breast cancer. She is here now for RFID localized partial mastectomy and axillary sentinel lymph node biopsy for clinical stage T1N0 ductal carcinoma. The risks, benefits and alternatives were discussed with the  patient. Questions were answered and she is agreeable to proceed. DESCRIPTION OF OPERATION: The patient was brought to the operating room and  placed on the OR table in the supine position. General anesthesia was  obtained and the right breast and axillary region were prepped and draped in  the usual sterile fashion. The patient had radioisotope injection after induction per nuclear medicine for identification of sentinel node. Patient had also gone to radiology prior to surgery for RFID localization. An incision was made in the upper outer periareolar border on the right breast and carried down through the skin and subcutaneous tissues. Flaps were created in the subcutanous tissue around the area of the tumor. The tumor was localized using the RFID localizer. The tissue around the tumor was dissected free from the surrounding breast tissue. The specimen was marked with a short suture superiorly, and a long suture laterally, and this was sent to radiology, which confirmed removal of the area in question, and then sent to pathology for permanent section. I took additional margins from all 6 sides, and these were marked with ink on the new true margins and also sent to pathology. There was a fairly large surgical defect caused by  the removal of the breast tissue which was measured to be 4 cm x 3 cm in size. This made a defect measuring 12 cm2. It was elected to repair the surgical  defect using an oncoplastic tissue advancement procedure to achieve a breast  reconstruction with local tissue flaps.  Tissue mobilization was done by  creating a superior and inferior tissue flap using cautery, and the flaps were  then mobilized and transferred to the central aspect of the surgical cavity. I placed a 2 cm low profile Biozorb device into the cavity. The deep tissue was then reapproximated to the Biozorb using interrupted 3-0 PDS suture. The subcutaneous tissue was closed with 3-0 vicryl. Once this was completed, the deformity in the breast was no longer present. A transverse incision was then made in the right axilla, and carried down thru the subcutaneous tissues, and the fascia was divided. The gamma probe was used to identify a sentinel lymph node within the axillary region. This node was dissected free from the surrounding tissue, and vessels and lymphatics were divided using the Harmonic scalpel. This node had a 10 second count of [id number] and was sent to pathology as axillary sentinel lymph node. The residual gamma probe activity within the axillary region was minimal. There were no other enlarged lymph nodes palpated within the axillary region. Hemostasis was assured. The wounds were irrigated and injected with local anesthetic, and both wounds were closed with layered vicryl, and the skin was re-approximated with 4-0 Monocryl and covered with surgical glue. Patient was placed in a surgical bra. Sponge, needle and instrument counts were correct per nursing. The patient tolerated the procedure well. She was extubated and taken to the  recovery room in stable condition.     Electronically signed by Gunjan Chau MD on 8/25/2021 at 10:22 AM

## 2021-08-25 NOTE — ANESTHESIA PRE PROCEDURE
Department of Anesthesiology  Preprocedure Note       Name:  Dl Parker   Age:  63 y.o.  :  1965                                          MRN:  [id number]         Date:  2021      Surgeon: Brianna Naranjo): Dang Pelletier MD    Procedure: Procedure(s):  RADIOFREQUENCY INDENTIFICATION LOCALIZED RIGHT LUMPECTOMY, RIGHT AXILLARY SENTINEL NODE BIOPSY WITH RADIOSOTOPE WITH GAMMA PROBE  . Medications prior to admission:   Prior to Admission medications    Medication Sig Start Date End Date Taking?  Authorizing Provider   polycarbophil (FIBERCON) 625 MG tablet Take by mouth 2 times daily   Yes Historical Provider, MD   MULTIPLE VITAMIN PO Take by mouth daily   Yes Historical Provider, MD   buPROPion (WELLBUTRIN XL) 150 MG extended release tablet  21  Yes Historical Provider, MD   escitalopram (LEXAPRO) 10 MG tablet  21  Yes Historical Provider, MD       Current medications:    Current Facility-Administered Medications   Medication Dose Route Frequency Provider Last Rate Last Admin    lactated ringers infusion   IntraVENous Continuous Neil Zuniga DO        ceFAZolin (ANCEF) 2000 mg in dextrose 5 % 50 mL IVPB  2,000 mg IntraVENous Once Dang Pelletier MD        acetaminophen (TYLENOL) tablet 650 mg  650 mg Oral 60 Min Pre-Op Dang Pelletier MD           Allergies:  No Known Allergies    Problem List:    Patient Active Problem List   Diagnosis Code    Malignant neoplasm of upper-outer quadrant of right breast in female, estrogen receptor positive [facility] C50.411, Z17.0       Past Medical History:        Diagnosis Date    Diverticulitis        Past Surgical History:        Procedure Laterality Date    APPENDECTOMY      CHOLECYSTECTOMY      COLECTOMY      COLONOSCOPY      LAPAROSCOPY      OVARY SURGERY Left     US BREAST NEEDLE BIOPSY RIGHT Right 8/10/2021    US BREAST NEEDLE BIOPSY RIGHT 8/10/2021 [address] [facility]    US GUIDED NEEDLE LOC OF RIGHT BREAST Right 2021 US GUIDED NEEDLE LOC OF RIGHT BREAST 8/17/2021 Jeanell Frankel,  [address] [facility]       Social History:    Social History     Tobacco Use    Smoking status: Never Smoker    Smokeless tobacco: Never Used   Substance Use Topics    Alcohol use: Never                                Counseling given: Not Answered      Vital Signs (Current):   Vitals:    08/23/21 1041 08/25/21 0732   BP:  (!) 147/90   Pulse:  67   Resp:  14   Temp:  98.3 °F (36.8 °C)   TempSrc:  Oral   SpO2:  97%   Weight: 163 lb (73.9 kg) 163 lb (73.9 kg)   Height: 5' 2.5\" (1.588 m) 5' 2\" (1.575 m)                                              BP Readings from Last 3 Encounters:   08/25/21 (!) 147/90       NPO Status: Time of last liquid consumption: 2100                        Time of last solid consumption: 2100                        Date of last liquid consumption: 08/24/21                        Date of last solid food consumption: 08/24/21    BMI:   Wt Readings from Last 3 Encounters:   08/25/21 163 lb (73.9 kg)   08/13/21 164 lb 6.4 oz (74.6 kg)   08/05/21 160 lb (72.6 kg)     Body mass index is 29.81 kg/m². CBC: No results found for: WBC, RBC, HGB, HCT, MCV, RDW, PLT    CMP: No results found for: NA, K, CL, CO2, BUN, CREATININE, GFRAA, AGRATIO, LABGLOM, GLUCOSE, PROT, CALCIUM, BILITOT, ALKPHOS, AST, ALT    POC Tests: No results for input(s): POCGLU, POCNA, POCK, POCCL, POCBUN, POCHEMO, POCHCT in the last 72 hours.     Coags: No results found for: PROTIME, INR, APTT    HCG (If Applicable):   Lab Results   Component Value Date    PREGTESTUR Negative 08/25/2021        ABGs: No results found for: PHART, PO2ART, XZD7ZDG, WXE9UCG, BEART, S0PRMNOU     Type & Screen (If Applicable):  No results found for: LABABO, LABRH    Drug/Infectious Status (If Applicable):  No results found for: HIV, HEPCAB    COVID-19 Screening (If Applicable): No results found for: COVID19        Anesthesia Evaluation    Airway: Mallampati: I  TM distance: >3 FB   Neck ROM: full  Mouth opening: > = 3 FB Dental: normal exam         Pulmonary:Negative Pulmonary ROS and normal exam                               Cardiovascular:Negative CV ROS  Exercise tolerance: good (>4 METS),                     Neuro/Psych:   Negative Neuro/Psych ROS              GI/Hepatic/Renal: Neg GI/Hepatic/Renal ROS            Endo/Other: Negative Endo/Other ROS                    Abdominal:             Vascular: negative vascular ROS. Other Findings:             Anesthesia Plan      general     ASA 2       Induction: intravenous. MIPS: Postoperative opioids intended. Anesthetic plan and risks discussed with patient. Use of blood products discussed with patient whom. Plan discussed with CRNA.     Attending anesthesiologist reviewed and agrees with Alcon Perkins MD   8/25/2021

## 2021-08-25 NOTE — ANESTHESIA POSTPROCEDURE EVALUATION
Department of Anesthesiology  Postprocedure Note    Patient: Ifeoma Rosenberg  MRN: [id number]  YOB: 1965  Date of evaluation: 8/25/2021  Time:  11:08 AM     Procedure Summary     Date: 08/25/21 Room / Location: [facility] [address]    Anesthesia Start: 6233 Anesthesia Stop: 8602    Procedures:       RADIOFREQUENCY INDENTIFICATION LOCALIZED RIGHT LUMPECTOMY, RIGHT AXILLARY SENTINEL NODE BIOPSY, BIOZORB INSERTION (Right )      . (Right ) Diagnosis:       Malignant neoplasm of upper-outer quadrant of right female breast, unspecified estrogen receptor status ([address]      (Malignant neoplasm of upper-outer quadrant of right female breast, unspecified estrogen receptor status ([address] [place])    Surgeons: Job White MD Responsible Provider: Sharon Gaytan MD    Anesthesia Type: general ASA Status: 2          Anesthesia Type: general    Gumaro Phase I: Gumaro Score: 9    Gumaro Phase II:      Last vitals: Reviewed and per EMR flowsheets.        Anesthesia Post Evaluation    Patient location during evaluation: PACU  Patient participation: complete - patient participated  Level of consciousness: awake and lethargic  Pain score: 2  Airway patency: patent  Nausea & Vomiting: no nausea and no vomiting  Complications: no  Cardiovascular status: hemodynamically stable and blood pressure returned to baseline  Respiratory status: spontaneous ventilation and nasal cannula  Hydration status: stable

## 2021-08-25 NOTE — PROGRESS NOTES
Patient admitted to pacu post [address] LUMPECTOMY, RIGHT AXILLARY SENTINEL NODE BIOPSY, BIOZORB INSERTION - Right with Dr. Brock Kellogg. Patient connected to bedside monitors; VSS. Per CRNA patient was stable intra-op.

## 2021-09-07 ENCOUNTER — OFFICE VISIT (OUTPATIENT)
Dept: SURGERY | Age: 56
End: 2021-09-07

## 2021-09-07 VITALS
WEIGHT: 166 LBS | BODY MASS INDEX: 29.41 KG/M2 | HEIGHT: 63 IN | TEMPERATURE: 97.8 F | SYSTOLIC BLOOD PRESSURE: 142 MMHG | DIASTOLIC BLOOD PRESSURE: 90 MMHG

## 2021-09-07 DIAGNOSIS — C50.411 MALIGNANT NEOPLASM OF UPPER-OUTER QUADRANT OF RIGHT BREAST IN FEMALE, ESTROGEN RECEPTOR POSITIVE (HCC): Primary | ICD-10-CM

## 2021-09-07 DIAGNOSIS — Z17.0 MALIGNANT NEOPLASM OF UPPER-OUTER QUADRANT OF RIGHT BREAST IN FEMALE, ESTROGEN RECEPTOR POSITIVE (HCC): Primary | ICD-10-CM

## 2021-09-07 DIAGNOSIS — Z90.11 S/P PARTIAL MASTECTOMY, RIGHT: ICD-10-CM

## 2021-09-07 PROCEDURE — 99024 POSTOP FOLLOW-UP VISIT: CPT | Performed by: SURGERY

## 2021-09-07 NOTE — PROGRESS NOTES
Subjective:      Patient ID: Kizzy Foreman is a 63 y.o. female. HPI   Chief Complaint   Patient presents with   [place] [address] Op     Patient is s/p right lumpectomy/snbx/Biozorb 8/25/2021 for T1cN0 ductal carcinoma, ER/SC positive, Her2 negative. Wounds healing well, minimal discomfort, instructed on wound care. To see oncology this week. Follow up in 5 months with right diagnostic mammogram.     Past Medical History:   Diagnosis Date    Diverticulitis        Past Surgical History:   Procedure Laterality Date    APPENDECTOMY      BREAST BIOPSY Right 8/25/2021    . performed by Ashwini Hein MD at [address] Right 8/25/2021    RADIOFREQUENCY INDENTIFICATION LOCALIZED RIGHT LUMPECTOMY, RIGHT AXILLARY SENTINEL NODE BIOPSY, BIOZORB INSERTION performed by Ashwini Hein MD at [address] Left     US BREAST NEEDLE BIOPSY RIGHT Right 8/10/2021    US BREAST NEEDLE BIOPSY RIGHT 8/10/2021 [facility]    US GUIDED NEEDLE LOC OF RIGHT BREAST Right 8/17/2021    US GUIDED NEEDLE LOC OF RIGHT BREAST 8/17/2021 Josette Colin MD [facility]       Current Outpatient Medications   Medication Sig Dispense Refill    polycarbophil (FIBERCON) 625 MG tablet Take by mouth 2 times daily      MULTIPLE VITAMIN PO Take by mouth daily      buPROPion (WELLBUTRIN XL) 150 MG extended release tablet       escitalopram (LEXAPRO) 10 MG tablet        No current facility-administered medications for this visit.        Social History     Socioeconomic History    Marital status:      Spouse name: Not on file    Number of children: Not on file    Years of education: Not on file    Highest education level: Not on file   Occupational History    Not on file   Tobacco Use    Smoking status: Never Smoker    Smokeless tobacco: Never Used   Substance and Sexual Activity    Alcohol use: Never    Drug use: Never    Sexual activity: Not on file   Other Topics Concern    Not on file   Social History Narrative    Not on file     Social Determinants of Health     Financial Resource Strain:     Difficulty of Paying Living Expenses:    Food Insecurity:     Worried About Running Out of Food in the Last Year:     [address] in the Last Year:    Transportation Needs:     Lack of Transportation (Medical):  Lack of Transportation (Non-Medical):    Physical Activity:     Days of Exercise per Week:     Minutes of Exercise per Session:    Stress:     Feeling of Stress :    Social Connections:     Frequency of Communication with Friends and Family:     Frequency of Social Gatherings with Friends and Family:     Attends [employer] Services:     Active Member of Clubs or Organizations:     Attends Club or Organization Meetings:     Marital Status:    Intimate Partner Violence:     Fear of Current or Ex-Partner:     Emotionally Abused:     Physically Abused:     Sexually Abused:        Objective:   Physical Exam        Assessment:      Diagnosis Orders   1. Malignant neoplasm of upper-outer quadrant of right breast in female, estrogen receptor positive ([facility] [address]     2.  S/P partial mastectomy, right            Plan:     See above       Electronically signed by Bailey Avalos MD on 9/7/2021 at 10:17 AM

## 2022-02-11 ENCOUNTER — OFFICE VISIT (OUTPATIENT)
Dept: SURGERY | Age: 57
End: 2022-02-11
Payer: COMMERCIAL

## 2022-02-11 ENCOUNTER — HOSPITAL ENCOUNTER (OUTPATIENT)
Dept: MAMMOGRAPHY | Age: 57
Discharge: HOME | End: 2022-02-11
Payer: COMMERCIAL

## 2022-02-11 VITALS
DIASTOLIC BLOOD PRESSURE: 72 MMHG | TEMPERATURE: 97.2 F | BODY MASS INDEX: 29.88 KG/M2 | OXYGEN SATURATION: 100 % | SYSTOLIC BLOOD PRESSURE: 116 MMHG | HEART RATE: 71 BPM | RESPIRATION RATE: 18 BRPM | HEIGHT: 63 IN

## 2022-02-11 DIAGNOSIS — Z85.3 PERSONAL HISTORY OF BREAST CANCER: ICD-10-CM

## 2022-02-11 DIAGNOSIS — Z90.11 HISTORY OF PARTIAL MASTECTOMY OF RIGHT BREAST: ICD-10-CM

## 2022-02-11 DIAGNOSIS — Z08 ENCOUNTER FOR FOLLOW-UP SURVEILLANCE OF BREAST CANCER: ICD-10-CM

## 2022-02-11 DIAGNOSIS — Z85.3 HISTORY OF BREAST CANCER: Primary | ICD-10-CM

## 2022-02-11 DIAGNOSIS — Z85.3 ENCOUNTER FOR FOLLOW-UP SURVEILLANCE OF BREAST CANCER: ICD-10-CM

## 2022-02-11 PROCEDURE — 99214 OFFICE O/P EST MOD 30 MIN: CPT | Performed by: NURSE PRACTITIONER

## 2022-02-11 PROCEDURE — G0279 TOMOSYNTHESIS, MAMMO: HCPCS

## 2022-02-11 ASSESSMENT — ENCOUNTER SYMPTOMS
COUGH: 0
ABDOMINAL PAIN: 0
SHORTNESS OF BREATH: 0

## 2022-02-11 NOTE — PROGRESS NOTES
[employer]   Surgical Breast Oncology     CC: 5month [id number] for breast check and mammogram     HPI: Rony Templeton is a 61 y.o. woman here for annual follow up for breast check secondary to personal history of right breast cancer. She is s/p right lumpectomy/snbx/Biozorb 8/25/2021 for T1cN0 invasive ductal carcinoma, ER/TX positive, Her2 negative. S/p adjuvant radiation therapy, completed 10/2021. Letrozole (started 11/2021) stopped beginning of January 1/2022 due to hot flashes, worsening depression, weight gain, and not feeling like herself; symptoms resolved after stopping medication. She understands the benefits of the medication and what the risks are of not taking the medication, does not think she wants to try an alternative but states she will think about it. She has no breast related concerns today. She states that she does perform routine self breast evaluations and has not noticed any new abnormalities such as masses, skin changes, color changes,nipple discharge, or changes to the nipple-areolar complex. Right unilateral diagnostic mammogram today 2/11/2022:  BioZorb device at 12:00 o'clock, 3 cm from the nipple in the anterior right breast tissue. The localizing clip and radiofrequency device has been removed. Probably benign postsurgical changes right breast.  No new concerning findings suggestive malignancy. BI-RADS 3. Past Medical History:   Diagnosis Date    Breast cancer ([facility] [address] 08/2021    Diverticulitis     History of therapeutic radiation        Past Surgical History:   Procedure Laterality Date    APPENDECTOMY      BREAST BIOPSY Right 8/25/2021    .  performed by Heather Camargo MD at [address] [place] [address] LUMPECTOMY Right 08/2021    BREAST SURGERY Right 8/25/2021    RADIOFREQUENCY INDENTIFICATION LOCALIZED RIGHT LUMPECTOMY, RIGHT AXILLARY SENTINEL NODE BIOPSY, BIOZORB INSERTION performed by Heather Camargo MD at [address] 1989    COLONOSCOPY      LAPAROSCOPY      OVARY SURGERY Left     US BREAST NEEDLE BIOPSY RIGHT Right 8/10/2021    US BREAST NEEDLE BIOPSY RIGHT 8/10/2021 [facility]TR[facility]    US GUIDED NEEDLE LOC OF RIGHT BREAST Right 8/17/2021    US GUIDED NEEDLE LOC OF RIGHT BREAST 8/17/2021 Kingsley Weaver MD [facility]TR[facility]       Family History   Problem Relation Age of Onset    Diabetes Mother     Diabetes Father        Allergies as of 02/11/2022    (No Known Allergies)       Social History     Tobacco Use    Smoking status: Never Smoker    Smokeless tobacco: Never Used   Vaping Use    Vaping Use: Never used   Substance Use Topics    Alcohol use: Never    Drug use: Never       Current Outpatient Medications on File Prior to Visit   Medication Sig Dispense Refill    polycarbophil (FIBERCON) 625 MG tablet Take by mouth 2 times daily      MULTIPLE VITAMIN PO Take by mouth daily      buPROPion (WELLBUTRIN XL) 150 MG extended release tablet       escitalopram (LEXAPRO) 10 MG tablet        No current facility-administered medications on file prior to visit. Medications: documentation has been reviewed in the electronic medical record and patient office intake form. REVIEW OF SYSTEMS:  Constitutional: Negative for fever  HENT: Negative for sore throat  Eyes: Negative for redness   Respiratory: Negative for dyspnea, cough  Cardiovascular: Negative for chest pain  Gastrointestinal: Negative for vomiting, diarrhea   Genitourinary: Negative for hematuria   Musculoskeletal: Negative for arthralgias   Skin: Negative for rash  Neurological: Negative for syncope  Hematological: Negative for adenopathy  Psychiatric/Behavorial: Negative for anxiety         PHYSICAL EXAM:  /72   Pulse 71   Temp 97.2 °F (36.2 °C)   Resp 18   Ht 5' 2.5\" (1.588 m)   SpO2 100%   BMI 29.88 kg/m²   Constitutional: She appearswell-nourished. No apparent distress.   Breast: The patient was examined in the upright and supine position. Breasts are ptotic L>R. Right: well healed scar. Volume loss present. Post surgical and radiation related changes as expected. No new masses or changes in breast contour. No skin changes of the breast or nipple areolar complex. No nipple inversion or discharge. No erythema, thickening (peau d'orange), or dimpling. Left: No new masses or changes in breast contour. No skin changes of the breast or nipple areolar complex. No nipple inversion or discharge. No erythema, thickening (peau d'orange), or dimpling. There is no axillary lymphadenopathy palpated bilaterally. Head: Normocephalic and atraumatic:   Eyes: EOM are normal. Pupils are equal, round, and reactive to light. Neck: Neck supple. No tracheal deviation present. No obvious mass. Lymphatics: No palpable supraclavicular, cervical, or axillary lymphadenopathy  Skin: No rash noted. No erythema. Neurologic: alert and oriented. Extremities: appear well perfused. ASSESSMENT:  - Screening Breast Examination - stable breast examination and stable right mammogram.   No concerning findings suggestive of malignancy or recurrence at this time. - Personal History of Breast Cancer - s/p right lumpectomy/snbx/Biozorb 8/25/2021 for T1cN0 invasive ductal carcinoma, ER/CO positive, Her2 negative. S/p adjuvant radiation therapy completed 10/2021.  - Aromatase inhibitor use - Letrozole per medical oncology. Patient self stopped 1/2022 due to s/e.   - Encounter for follow-up surveillance of breast cancer    PLAN:    Bilateral diagnostic mammogram 6-month follow-up, due 8/2022   Reviewed benefits of AIs. Recommend follow up with medical oncology to discuss alterative AI   Signs/symptoms of recurrence were reviewed. She verbalizes understanding that she should notify the office if she identifies any abnormalities on self evaluation.    Follow up cancer surveillance discussed    Discussed the importance of breast awareness including the importance and technique of self breast exams   Today's imaging was reviewed, documented above, the results were discussed with the patient, all questions answered   Healthy Lifestyle Recommendations: healthy diet (decrease consumption of red meat, increase fresh fruits and vegetables), decreased alcohol consumption (less than 4 drinks/week), adequate sleep (goal 6-8 hours), routine exercise (goal 150 minutes/week or greater), weight control.  Follow up after mammogram          TREMAINE Baker  [facility])   Surgical Breast Oncology   [phone]    All of the patient's questions were answered at this time however, she was encouraged to call the office with any further inquiries. Approximately 30 minutes of time were spent in preparation, direct patient contact, counseling, care coordination, documentation and activities otherwise related to this encounter.

## 2022-02-11 NOTE — PATIENT INSTRUCTIONS
Healthy Lifestyle Recommendations: healthy diet (decrease consumption of red meat, increase fresh fruits and vegetables), decreased alcohol consumption (less than 4 drinks/week), adequate sleep (goal 6-8 hours), routine exercise (goal 150 minutes/week or greater), weight control. Patient Education        Breast Self-Exam: Care Instructions  Your Care Instructions     A breast self-exam is when you check your breasts for lumps or changes. This regular exam helps you learn how your breasts normally look and feel. Most breast problems or changes are not because of cancer. Breast self-exam is not a substitute for a mammogram. Having regular breast exams by your doctor and regular mammograms improve your chances of finding any problems with your breasts. Some women set a time each month to do a step-by-step breast self-exam. Other women like a less formal system. They might look at their breasts as they brush their teeth, or feel their breasts once in a while in the shower. If you notice a change in your breast, tell your doctor. Follow-up care is a key part of your treatment and safety. Be sure to make and go to all appointments, and call your doctor if you are having problems. It's also a good idea to know your test results and keep a list of the medicines you take. How do you do a breast self-exam?  · The best time to examine your breasts is usually one week after your menstrual period begins. Your breasts should not be tender then. If you do not have periods, you might do your exam on a day of the month that is easy to remember. · To examine your breasts:  ? Remove all your clothes above the waist and lie down. When you are lying down, your breast tissue spreads evenly over your chest wall, which makes it easier to feel all your breast tissue. ?  Use the pads--not the fingertips--of the 3 middle fingers of your left hand to check your right breast. Move your fingers slowly in small coin-sized circles that overlap. ? Use three levels of pressure to feel of all your breast tissue. Use light pressure to feel the tissue close to the skin surface. Use medium pressure to feel a little deeper. Use firm pressure to feel your tissue close to your breastbone and ribs. Use each pressure level to feel your breast tissue before moving on to the next spot. ? Check your entire breast, moving up and down as if following a strip from the collarbone to the bra line, and from the armpit to the ribs. Repeat until you have covered the entire breast.  ? Repeat this procedure for your left breast, using the pads of the 3 middle fingers of your right hand. · To examine your breasts while in the shower:  ? Place one arm over your head and lightly soap your breast on that side. ? Using the pads of your fingers, gently move your hand over your breast (in the strip pattern described above), feeling carefully for any lumps or changes. ? Repeat for the other breast.  · Have your doctor inspect anything you notice to see if you need further testing. Where can you learn more? Go to https://[employer]. org and sign in to your [employer] account. Enter P148 in the [employer] box to learn more about \"Breast Self-Exam: Care Instructions. \"     If you do not have an account, please click on the \"Sign Up Now\" link. Current as of: September 8, 2021               Content Version: 13.1  © 2006-2021 Healthwise, Incorporated. Care instructions adapted under license by [facility] ([facility]). If you have questions about a medical condition or this instruction, always ask your healthcare professional. [place] [address] any warranty or liability for your use of this information. Constipation, unspecified constipation type

## 2022-08-11 ENCOUNTER — OFFICE VISIT (OUTPATIENT)
Dept: SURGERY | Age: 57
End: 2022-08-11
Payer: COMMERCIAL

## 2022-08-11 ENCOUNTER — HOSPITAL ENCOUNTER (OUTPATIENT)
Dept: MAMMOGRAPHY | Age: 57
Discharge: HOME | End: 2022-08-11
Payer: COMMERCIAL

## 2022-08-11 VITALS
OXYGEN SATURATION: 99 % | TEMPERATURE: 97.3 F | RESPIRATION RATE: 18 BRPM | SYSTOLIC BLOOD PRESSURE: 118 MMHG | WEIGHT: 179 LBS | HEIGHT: 63 IN | BODY MASS INDEX: 31.71 KG/M2 | DIASTOLIC BLOOD PRESSURE: 72 MMHG | HEART RATE: 79 BPM

## 2022-08-11 VITALS — BODY MASS INDEX: 29.41 KG/M2 | HEIGHT: 63 IN | WEIGHT: 166 LBS

## 2022-08-11 DIAGNOSIS — Z17.0 MALIGNANT NEOPLASM OF UPPER-OUTER QUADRANT OF RIGHT BREAST IN FEMALE, ESTROGEN RECEPTOR POSITIVE (HCC): ICD-10-CM

## 2022-08-11 DIAGNOSIS — Z90.11 HISTORY OF PARTIAL MASTECTOMY OF RIGHT BREAST: ICD-10-CM

## 2022-08-11 DIAGNOSIS — Z85.3 HX OF BREAST CANCER: ICD-10-CM

## 2022-08-11 DIAGNOSIS — Z08 ENCOUNTER FOR FOLLOW-UP SURVEILLANCE OF BREAST CANCER: ICD-10-CM

## 2022-08-11 DIAGNOSIS — C50.411 MALIGNANT NEOPLASM OF UPPER-OUTER QUADRANT OF RIGHT BREAST IN FEMALE, ESTROGEN RECEPTOR POSITIVE (HCC): ICD-10-CM

## 2022-08-11 DIAGNOSIS — Z85.3 ENCOUNTER FOR FOLLOW-UP SURVEILLANCE OF BREAST CANCER: ICD-10-CM

## 2022-08-11 DIAGNOSIS — Z85.3 HISTORY OF BREAST CANCER: Primary | ICD-10-CM

## 2022-08-11 DIAGNOSIS — Z12.31 ENCOUNTER FOR SCREENING MAMMOGRAM FOR BREAST CANCER: ICD-10-CM

## 2022-08-11 PROCEDURE — G0279 TOMOSYNTHESIS, MAMMO: HCPCS

## 2022-08-11 PROCEDURE — 77066 DX MAMMO INCL CAD BI: CPT

## 2022-08-11 PROCEDURE — 99213 OFFICE O/P EST LOW 20 MIN: CPT | Performed by: NURSE PRACTITIONER

## 2022-08-11 NOTE — PROGRESS NOTES
[facility])   Surgical Breast Oncology     CC: 5month [id number] for breast check and mammogram     HPI: Ashkan Zhou is a 61 y.o. woman here for annual follow up for breast check secondary to personal history of right breast cancer. She is s/p right lumpectomy/snbx/Biozorb 8/25/2021 for T1cN0 invasive ductal carcinoma, ER/NH positive, Her2 negative. S/p adjuvant radiation therapy, completed 10/2021. Letrozole (started 11/2021, stopped beginning of January 1/2022 due to hot flashes, worsening depression, weight gain, and not feeling like herself; symptoms resolved after stopping medication). She understands the benefits of the medication and what the risks are of not taking the medication, declines to resume. She has no breast related concerns today. She states that she does perform routine self breast evaluations and has not noticed any new abnormalities such as masses, skin changes, color changes,nipple discharge, or changes to the nipple-areolar complex. INTERVAL HISTORY:  Right unilateral diagnostic mammogram 2/11/2022:  BioZorb device at 12:00 o'clock, 3 cm from the nipple in the anterior right breast tissue. The localizing clip and radiofrequency device has been removed. Probably benign postsurgical changes right breast.  No new concerning findings suggestive malignancy. BI-RADS 3.    Bilateral diagnostic mammogram 8/11/2022:  BioZorb device at 12:00, 3 cm FN right breast, mild volume loss; consistent with patient's history and unchanged from most recent study. No new concerning findings suggestive of malignancy in either the right or left breast.  BI-RADS 2. Past Medical History:   Diagnosis Date    Breast cancer ([address] 08/2021    Diverticulitis     History of therapeutic radiation        Past Surgical History:   Procedure Laterality Date    APPENDECTOMY      BREAST BIOPSY Right 8/25/2021    .  performed by Rafaela Morgan MD at [facility]. LUMPECTOMY Right 08/2021    BREAST SURGERY Right 8/25/2021    RADIOFREQUENCY INDENTIFICATION LOCALIZED RIGHT LUMPECTOMY, RIGHT AXILLARY SENTINEL NODE BIOPSY, BIOZORB INSERTION performed by Rosie March MD at [address] Left     US BREAST NEEDLE BIOPSY RIGHT Right 8/10/2021    US BREAST NEEDLE BIOPSY RIGHT 8/10/2021 [facility] MOB ULTRASOUND    US GUIDED NEEDLE LOC OF RIGHT BREAST Right 8/17/2021    US GUIDED NEEDLE LOC OF RIGHT BREAST 8/17/2021 Nikki Weiner MD [facility] MOB ULTRASOUND       Family History   Problem Relation Age of Onset    Diabetes Mother     Diabetes Father        Allergies as of 08/11/2022    (No Known Allergies)       Social History     Tobacco Use    Smoking status: Never    Smokeless tobacco: Never   Vaping Use    Vaping Use: Never used   Substance Use Topics    Alcohol use: Never    Drug use: Never       Current Outpatient Medications on File Prior to Visit   Medication Sig Dispense Refill    polycarbophil (FIBERCON) 625 MG tablet Take by mouth 2 times daily      MULTIPLE VITAMIN PO Take by mouth daily      buPROPion (WELLBUTRIN XL) 150 MG extended release tablet       escitalopram (LEXAPRO) 10 MG tablet        No current facility-administered medications on file prior to visit. Medications: documentation has been reviewed in the electronic medical record and patient office intake form.        REVIEW OF SYSTEMS:  Constitutional: Negative for fever  HENT: Negative for sore throat  Eyes: Negative for redness   Respiratory: Negative for dyspnea, cough  Cardiovascular: Negative for chest pain  Gastrointestinal: Negative for vomiting, diarrhea   Genitourinary: Negative for hematuria   Musculoskeletal: Negative for arthralgias   Skin: Negative for rash  Neurological: Negative for syncope  Hematological: Negative for adenopathy  Psychiatric/Behavorial: Negative for anxiety         PHYSICAL EXAM:  /72   Pulse 79   Temp 97.3 °F (36.3 °C) Resp 18   Ht 5' 2.5\" (1.588 m)   Wt 179 lb (81.2 kg)   SpO2 99%   BMI 32.22 kg/m²   Constitutional: She appearswell-nourished. No apparent distress. Breast: The patient was examined in the upright and supine position. Breasts are ptotic L>R. Right: well healed scar. Volume loss present. Post surgical and radiation related changes as expected. No new masses or changes in breast contour. No skin changes of the breast or nipple areolar complex. No nipple inversion or discharge. No erythema, thickening (peau d'orange), or dimpling. Left: No new masses or changes in breast contour. No skin changes of the breast or nipple areolar complex. No nipple inversion or discharge. No erythema, thickening (peau d'orange), or dimpling. There is no axillary lymphadenopathy palpated bilaterally. Head: Normocephalic and atraumatic:   Eyes: EOM are normal. Pupils are equal, round, and reactive to light. Neck: Neck supple. No tracheal deviation present. No obvious mass. Lymphatics: No palpable supraclavicular, cervical, or axillary lymphadenopathy  Skin: No rash noted. No erythema. Neurologic: alert and oriented. Extremities: appear well perfused. ASSESSMENT:  - Screening Breast Examination - stable breast examination and stable right mammogram.   No concerning findings suggestive of malignancy or recurrence at this time. - Personal History of Breast Cancer - s/p right lumpectomy/snbx/Biozorb 8/25/2021 for T1cN0 invasive ductal carcinoma, ER/SC positive, Her2 negative. S/p adjuvant radiation therapy completed 10/2021. AI for 2 months, self-stopped. - Encounter for follow-up surveillance of breast cancer    PLAN:   Resume annual screening mammography, due 8/2022  Recommend annual clinical breast exam  Signs/symptoms of recurrence were reviewed. She verbalizes understanding that she should notify the office if she identifies any abnormalities on self evaluation.   Follow up cancer surveillance discussed   Discussed the importance of breast awareness including the importance and technique of self breast exams  Today's imaging was reviewed, documented above, the results were discussed with the patient, all questions answered  Reviewed breast density findings on mammogram with patient   Reviewed characteristics of biozorb device with patient   Healthy Lifestyle Recommendations: healthy diet (decrease consumption of red meat, increase fresh fruits and vegetables), decreased alcohol consumption (less than 4 drinks/week), adequate sleep (goal 6-8 hours), routine exercise (goal 150 minutes/week or greater), weight control. Follow up after mammogram          TREMAINE Richardson  [facility])   Surgical Breast Oncology   [phone]    All of the patient's questions were answered at this time however, she was encouraged to call the office with any further inquiries. Approximately 20 minutes of time were spent in preparation, direct patient contact, counseling, care coordination, documentation and activities otherwise related to this encounter.

## 2022-08-11 NOTE — PATIENT INSTRUCTIONS
Healthy Lifestyle Recommendations: healthy diet (decrease consumption of red meat, increase fresh fruits and vegetables), decreased alcohol consumption (less than 4 drinks/week), adequate sleep (goal 6-8 hours), routine exercise (goal 150 minutes/week or greater), weight control. Patient Education        Breast Self-Exam: Care Instructions  Your Care Instructions     A breast self-exam is when you check your breasts for lumps or changes. This regular exam helps you learn how your breasts normally look and feel. Mostbreast problems or changes are not because of cancer. Breast self-exam is not a substitute for a mammogram. Having regular breast exams by your doctor and regular mammograms improve your chances of finding anyproblems with your breasts. Some women set a time each month to do a step-by-step breast self-exam. Other women like a less formal system. They might look at their breasts as they brushtheir teeth, or feel their breasts once in a while in the shower. If you notice a change in your breast, tell your doctor. Follow-up care is a key part of your treatment and safety. Be sure to make and go to all appointments, and call your doctor if you are having problems. It's also a good idea to know your test results and keep alist of the medicines you take. How do you do a breast self-exam?  The best time to examine your breasts is usually one week after your menstrual period begins. Your breasts should not be tender then. If you do not have periods, you might do your exam on a day of the month that is easy to remember. To examine your breasts:  Remove all your clothes above the waist and lie down. When you are lying down, your breast tissue spreads evenly over your chest wall, which makes it easier to feel all your breast tissue. Use the pads--not the fingertips--of the 3 middle fingers of your left hand to check your right breast. Move your fingers slowly in small coin-sized circles that overlap.   Use three levels of pressure to feel of all your breast tissue. Use light pressure to feel the tissue close to the skin surface. Use medium pressure to feel a little deeper. Use firm pressure to feel your tissue close to your breastbone and ribs. Use each pressure level to feel your breast tissue before moving on to the next spot. Check your entire breast, moving up and down as if following a strip from the collarbone to the bra line, and from the armpit to the ribs. Repeat until you have covered the entire breast.  Repeat this procedure for your left breast, using the pads of the 3 middle fingers of your right hand. To examine your breasts while in the shower:  Place one arm over your head and lightly soap your breast on that side. Using the pads of your fingers, gently move your hand over your breast (in the strip pattern described above), feeling carefully for any lumps or changes. Repeat for the other breast.  Have your doctor inspect anything you notice to see if you need further testing. Where can you learn more? Go to https://[employer].in.[employer]. org and sign in to your [employer] account. Enter P148 in the [facility] box to learn more about \"Breast Self-Exam: Care Instructions. \"     If you do not have an account, please click on the \"Sign Up Now\" link. Current as of: September 8, 2021               Content Version: 13.3  © 3656-2559 Healthwise, Incorporated. Care instructions adapted under license by [facility] [address] If you have questions about a medical condition or this instruction, always ask your healthcare professional. [place] [address] any warranty or liability for your use of this information.

## 2023-08-14 ENCOUNTER — OFFICE VISIT (OUTPATIENT)
Dept: SURGERY | Age: 58
End: 2023-08-14
Payer: COMMERCIAL

## 2023-08-14 ENCOUNTER — TELEPHONE (OUTPATIENT)
Dept: SURGERY | Age: 58
End: 2023-08-14

## 2023-08-14 ENCOUNTER — HOSPITAL ENCOUNTER (OUTPATIENT)
Dept: MAMMOGRAPHY | Age: 58
Discharge: HOME | End: 2023-08-14
Payer: COMMERCIAL

## 2023-08-14 VITALS
BODY MASS INDEX: 31.01 KG/M2 | OXYGEN SATURATION: 97 % | RESPIRATION RATE: 18 BRPM | HEART RATE: 56 BPM | WEIGHT: 175 LBS | HEIGHT: 63 IN

## 2023-08-14 VITALS — BODY MASS INDEX: 31.01 KG/M2 | HEIGHT: 63 IN | WEIGHT: 175 LBS

## 2023-08-14 DIAGNOSIS — Z85.3 HISTORY OF BREAST CANCER: ICD-10-CM

## 2023-08-14 DIAGNOSIS — Z90.11 HISTORY OF PARTIAL MASTECTOMY OF RIGHT BREAST: ICD-10-CM

## 2023-08-14 DIAGNOSIS — Z85.3 ENCOUNTER FOR FOLLOW-UP SURVEILLANCE OF BREAST CANCER: ICD-10-CM

## 2023-08-14 DIAGNOSIS — Z85.3 ENCOUNTER FOR FOLLOW-UP SURVEILLANCE OF BREAST CANCER: Primary | ICD-10-CM

## 2023-08-14 DIAGNOSIS — Z12.31 ENCOUNTER FOR SCREENING MAMMOGRAM FOR BREAST CANCER: ICD-10-CM

## 2023-08-14 DIAGNOSIS — Z08 ENCOUNTER FOR FOLLOW-UP SURVEILLANCE OF BREAST CANCER: Primary | ICD-10-CM

## 2023-08-14 DIAGNOSIS — Z08 ENCOUNTER FOR FOLLOW-UP SURVEILLANCE OF BREAST CANCER: ICD-10-CM

## 2023-08-14 DIAGNOSIS — Z12.39 ENCOUNTER FOR SCREENING BREAST EXAMINATION: Primary | ICD-10-CM

## 2023-08-14 PROCEDURE — 99213 OFFICE O/P EST LOW 20 MIN: CPT | Performed by: NURSE PRACTITIONER

## 2023-08-14 PROCEDURE — G0279 TOMOSYNTHESIS, MAMMO: HCPCS

## 2023-08-14 RX ORDER — ATORVASTATIN CALCIUM 10 MG/1
TABLET, FILM COATED ORAL
COMMUNITY
Start: 2023-07-18

## 2023-08-14 NOTE — PROGRESS NOTES
radiation        Past Surgical History:   Procedure Laterality Date    APPENDECTOMY      BREAST BIOPSY Right 8/25/2021    . performed by Crystal Forrest MD at [address] LUMPECTOMY Right 08/2021    BREAST SURGERY Right 8/25/2021    RADIOFREQUENCY INDENTIFICATION LOCALIZED RIGHT LUMPECTOMY, RIGHT AXILLARY SENTINEL NODE BIOPSY, BIOZORB INSERTION performed by Crystal Forrest MD at [address] [facility] Left     US BREAST BIOPSY W LOC DEVICE 1ST LESION RIGHT Right 8/10/2021    US BREAST NEEDLE BIOPSY RIGHT 8/10/2021 [facility] MOB ULTRASOUND    US GUIDED NEEDLE LOC OF RIGHT BREAST Right 8/17/2021    US GUIDED NEEDLE LOC OF RIGHT BREAST 8/17/2021 Viraj Jacobo MD [facility] MOB ULTRASOUND       Family History   Problem Relation Age of Onset    Diabetes Mother     Diabetes Father        Allergies as of 08/14/2023    (No Known Allergies)       Social History     Tobacco Use    Smoking status: Never    Smokeless tobacco: Never   Vaping Use    Vaping Use: Never used   Substance Use Topics    Alcohol use: Never    Drug use: Never       Current Outpatient Medications on File Prior to Visit   Medication Sig Dispense Refill    atorvastatin (LIPITOR) 10 MG tablet       polycarbophil (FIBERCON) 625 MG tablet Take by mouth 2 times daily      MULTIPLE VITAMIN PO Take by mouth daily      buPROPion (WELLBUTRIN XL) 150 MG extended release tablet       escitalopram (LEXAPRO) 10 MG tablet        No current facility-administered medications on file prior to visit. Medications: documentation has been reviewed in the electronic medical record and patient office intake form.        REVIEW OF SYSTEMS:  Constitutional: Negative for fever  HENT: Negative for sore throat  Eyes: Negative for redness   Respiratory: Negative for dyspnea, cough  Cardiovascular: Negative for chest pain  Gastrointestinal: Negative for vomiting, diarrhea   Genitourinary: Negative for

## 2023-08-14 NOTE — TELEPHONE ENCOUNTER
Called patient to move to 1:30 pm mammo-today with Nathan Cody @ 2:30 pm at [place].   We have a opening   Please ask if she may come in at 1:30 pm   If not we will see her at 2:30 pm -Mammo/ 3:30 w/ Dolores Saucedo

## 2024-08-19 ENCOUNTER — HOSPITAL ENCOUNTER (OUTPATIENT)
Dept: MAMMOGRAPHY | Age: 59
Discharge: HOME | End: 2024-08-19
Payer: COMMERCIAL

## 2024-08-19 ENCOUNTER — OFFICE VISIT (OUTPATIENT)
Dept: SURGERY | Age: 59
End: 2024-08-19
Payer: COMMERCIAL

## 2024-08-19 VITALS — BODY MASS INDEX: 32.2 KG/M2 | HEIGHT: 62 IN | WEIGHT: 175 LBS

## 2024-08-19 VITALS
RESPIRATION RATE: 18 BRPM | HEIGHT: 62 IN | HEART RATE: 70 BPM | BODY MASS INDEX: 34.52 KG/M2 | OXYGEN SATURATION: 96 % | WEIGHT: 187.6 LBS

## 2024-08-19 DIAGNOSIS — Z85.3 ENCOUNTER FOR FOLLOW-UP SURVEILLANCE OF BREAST CANCER: ICD-10-CM

## 2024-08-19 DIAGNOSIS — Z85.3 HISTORY OF BREAST CANCER: ICD-10-CM

## 2024-08-19 DIAGNOSIS — Z08 ENCOUNTER FOR FOLLOW-UP SURVEILLANCE OF BREAST CANCER: ICD-10-CM

## 2024-08-19 DIAGNOSIS — Z12.31 ENCOUNTER FOR SCREENING MAMMOGRAM FOR BREAST CANCER: ICD-10-CM

## 2024-08-19 DIAGNOSIS — Z08 ENCOUNTER FOR FOLLOW-UP SURVEILLANCE OF BREAST CANCER: Primary | ICD-10-CM

## 2024-08-19 DIAGNOSIS — Z12.39 ENCOUNTER FOR SCREENING BREAST EXAMINATION: Primary | ICD-10-CM

## 2024-08-19 DIAGNOSIS — Z85.3 ENCOUNTER FOR FOLLOW-UP SURVEILLANCE OF BREAST CANCER: Primary | ICD-10-CM

## 2024-08-19 DIAGNOSIS — Z90.11 HISTORY OF PARTIAL MASTECTOMY OF RIGHT BREAST: ICD-10-CM

## 2024-08-19 PROCEDURE — 99213 OFFICE O/P EST LOW 20 MIN: CPT | Performed by: NURSE PRACTITIONER

## 2024-08-19 PROCEDURE — 77063 BREAST TOMOSYNTHESIS BI: CPT

## 2024-08-19 NOTE — PATIENT INSTRUCTIONS
Healthy Lifestyle Recommendations: healthy diet (decrease consumption of red meat, increase fresh fruits and vegetables), decreased alcohol consumption (less than 4 drinks/week), adequate sleep (goal 6-8 hours), routine exercise (goal 150 minutes/week or greater), weight control. Patient Education        Breast Self-Exam: Care Instructions  Overview     A breast self-exam means regularly checking your breasts for lumps or changes. Depending on your health history, your doctor may tell you to do breast self-exams. These help you learn how your breasts normally look and feel. Most breast problems or changes are not because of cancer.  Breast self-exams don't take the place of a mammogram. Having regular breast exams by your doctor and mammograms can improve your chances of finding any problems with your breasts.  If you notice a change in your breast, tell your doctor. This may include any new lump, nipple discharge, or redness or a change in the skin's usual color.  Follow-up care is a key part of your treatment and safety. Be sure to make and go to all appointments, and call your doctor if you are having problems. It's also a good idea to know your test results and keep a list of the medicines you take.  How do you do a breast self-exam?  Try to set a time each month to do a step-by-step breast self-exam. If you have a menstrual period, the best time to check your breasts is usually 1 week after your period begins. Your breasts should not be tender then. If you don't have periods, you might do your exam on a day of the month that is easy to remember.  To check your breasts:  Remove all your clothes above the waist and lie down. When you are lying down, your breast tissue spreads evenly over your chest wall, which makes it easier to feel all your breast tissue.  Use the pads--not the fingertips--of the 3 middle fingers of your left hand to check your right breast. Move your fingers slowly in small coin-sized circles

## 2024-08-19 NOTE — PROGRESS NOTES
[facility]   Surgical Breast Oncology     CC: Annual [id number] for breast check and mammogram     HPI: Sona Maxwell is a 58 y.o. female here for annual follow up for breast check secondary to personal history of right breast cancer.  She is s/p right lumpectomy/snbx/Biozorb 8/25/2021 for T1cN0 invasive ductal carcinoma, ER/GA positive, Her2 negative.  S/p adjuvant radiation therapy, completed 10/2021.  Letrozole (started 11/2021, stopped beginning of January 1/2022 due to hot flashes, worsening depression, weight gain, and not feeling like herself; symptoms resolved after stopping medication).  She understands the benefits of the medication and what the risks are of not taking the medication, declines to resume.    She has no breast related concerns today.  She states that she does perform routine self breast evaluations and has not noticed any new abnormalities such as masses, skin changes, color changes,nipple discharge, or changes to the nipple-areolar complex.     INTERVAL HISTORY:  Right unilateral diagnostic mammogram 2/11/2022:  BioZorb device at 12:00 o'clock, 3 cm from the nipple in the anterior right breast tissue. The localizing clip and radiofrequency device has been removed.  Probably benign postsurgical changes right breast.  No new concerning findings suggestive malignancy.  BI-RADS 3.    Bilateral diagnostic mammogram 8/11/2022:  BioZorb device at 12:00, 3 cm FN right breast, mild volume loss; consistent with patient's history and unchanged from most recent study.  No new concerning findings suggestive of malignancy in either the right or left breast.  BI-RADS 2.    Bilateral screening mammogram 8/14/2023  Postsurgical changes including BioZorb device in the right breast at 12:00, 2 cm from the nipple:.  No new concerning findings suggestive of malignancy.  BI-RADS2.    Bilateral screening mammogram 8/19/2024:  Stable post-surgical changes and volume loss right breast.  No new concerning

## 2025-08-28 ENCOUNTER — HOSPITAL ENCOUNTER (OUTPATIENT)
Dept: MAMMOGRAPHY | Age: 60
Discharge: HOME | End: 2025-08-28
Payer: COMMERCIAL

## 2025-08-28 ENCOUNTER — OFFICE VISIT (OUTPATIENT)
Dept: SURGERY | Age: 60
End: 2025-08-28
Payer: COMMERCIAL

## 2025-08-28 VITALS — BODY MASS INDEX: 29.77 KG/M2 | WEIGHT: 168 LBS | RESPIRATION RATE: 18 BRPM | HEIGHT: 63 IN

## 2025-08-28 VITALS — BODY MASS INDEX: 29.77 KG/M2 | HEIGHT: 63 IN | WEIGHT: 168 LBS

## 2025-08-28 DIAGNOSIS — Z90.11 HISTORY OF PARTIAL MASTECTOMY OF RIGHT BREAST: ICD-10-CM

## 2025-08-28 DIAGNOSIS — Z85.3 ENCOUNTER FOR FOLLOW-UP SURVEILLANCE OF BREAST CANCER: Primary | ICD-10-CM

## 2025-08-28 DIAGNOSIS — Z85.3 ENCOUNTER FOR FOLLOW-UP SURVEILLANCE OF BREAST CANCER: ICD-10-CM

## 2025-08-28 DIAGNOSIS — Z12.31 ENCOUNTER FOR SCREENING MAMMOGRAM FOR BREAST CANCER: ICD-10-CM

## 2025-08-28 DIAGNOSIS — Z85.3 HISTORY OF BREAST CANCER: ICD-10-CM

## 2025-08-28 DIAGNOSIS — Z08 ENCOUNTER FOR FOLLOW-UP SURVEILLANCE OF BREAST CANCER: ICD-10-CM

## 2025-08-28 DIAGNOSIS — Z12.39 ENCOUNTER FOR SCREENING BREAST EXAMINATION: Primary | ICD-10-CM

## 2025-08-28 DIAGNOSIS — Z08 ENCOUNTER FOR FOLLOW-UP SURVEILLANCE OF BREAST CANCER: Primary | ICD-10-CM

## 2025-08-28 PROCEDURE — 99213 OFFICE O/P EST LOW 20 MIN: CPT | Performed by: NURSE PRACTITIONER

## 2025-08-28 PROCEDURE — 77063 BREAST TOMOSYNTHESIS BI: CPT

## (undated) DEVICE — PENCIL ES ULT VAC W TELSCP NOSE EZ CLN BLDE 10FT

## (undated) DEVICE — SUTURE PDS II SZ 3-0 L27IN ABSRB VLT L26MM SH 1/2 CIR Z316H

## (undated) DEVICE — PROBE LOCALIZER W/DRAPE

## (undated) DEVICE — SUTURE VCRL SZ 3-0 L27IN ABSRB UD L26MM SH 1/2 CIR J416H

## (undated) DEVICE — STANDARD HYPODERMIC NEEDLE,POLYPROPYLENE HUB: Brand: MONOJECT

## (undated) DEVICE — GLOVE ORANGE PI 7   MSG9070

## (undated) DEVICE — E-Z CLEAN, NON-STICK, PTFE COATED, ELECTROSURGICAL BLADE ELECTRODE, MODIFIED EXTENDED INSULATION, 2.5 INCH (6.35 CM): Brand: MEGADYNE

## (undated) DEVICE — PROVE COVER: Brand: UNBRANDED

## (undated) DEVICE — JEWISH HOSPITAL TURNOVER KIT: Brand: MEDLINE INDUSTRIES, INC.

## (undated) DEVICE — BOWL MED L 32OZ PLAS W/ MOLD GRAD EZ OPN PEEL PCH

## (undated) DEVICE — DRAPE,T,LAPARO,TRANS,STERILE: Brand: MEDLINE

## (undated) DEVICE — GOWN,SIRUS,POLYRNF,BRTHSLV,XLN/XL,20/CS: Brand: MEDLINE

## (undated) DEVICE — CONTAINER,SPECIMEN,PNEU TUBE,3OZ,OR STRL: Brand: MEDLINE

## (undated) DEVICE — YANKAUER,OPEN TIP,W/O VENT,STERILE: Brand: MEDLINE INDUSTRIES, INC.

## (undated) DEVICE — SUTURE MCRYL SZ 4-0 L27IN ABSRB UD L19MM PS-2 1/2 CIR PRIM Y426H

## (undated) DEVICE — MEDICINE CUP, GRADUATED, STER: Brand: MEDLINE

## (undated) DEVICE — APPLICATOR MEDICATED 26 CC SOLUTION HI LT ORNG CHLORAPREP

## (undated) DEVICE — COVER LT HNDL BLU PLAS

## (undated) DEVICE — GLOVE SURG SZ 7 L12IN FNGR THK79MIL GRN LTX FREE

## (undated) DEVICE — INTENDED USE FOR SURGICAL MARKING ON INTACT SKIN, ALSO PROVIDES A PERMANENT METHOD OF IDENTIFYING OBJECTS IN THE OPERATING ROOM: Brand: WRITESITE® PLUS MINI PREP RESISTANT MARKER

## (undated) DEVICE — PLATE ES AD W 9FT CRD 2

## (undated) DEVICE — Device

## (undated) DEVICE — ADHESIVE SKIN CLSR 0.7ML TOP DERMBND ADV

## (undated) DEVICE — GARMENT,MEDLINE,DVT,INT,CALF,MED, GEN2: Brand: MEDLINE

## (undated) DEVICE — SURGICAL SET UP - SURE SET: Brand: MEDLINE INDUSTRIES, INC.

## (undated) DEVICE — TOWEL,STOP FLAG GOLD N-W: Brand: MEDLINE

## (undated) DEVICE — CONTAINER SPEC TRNSPRT DUBLIN

## (undated) DEVICE — INTENDED FOR TISSUE SEPARATION, AND OTHER PROCEDURES THAT REQUIRE A SHARP SURGICAL BLADE TO PUNCTURE OR CUT.: Brand: BARD-PARKER ® CARBON RIB-BACK BLADES